# Patient Record
Sex: FEMALE | Race: WHITE | NOT HISPANIC OR LATINO | Employment: UNEMPLOYED | URBAN - METROPOLITAN AREA
[De-identification: names, ages, dates, MRNs, and addresses within clinical notes are randomized per-mention and may not be internally consistent; named-entity substitution may affect disease eponyms.]

---

## 2017-11-09 PROBLEM — M25.512 CHRONIC LEFT SHOULDER PAIN: Status: ACTIVE | Noted: 2017-11-09

## 2017-11-09 PROBLEM — G89.29 CHRONIC LEFT SHOULDER PAIN: Status: ACTIVE | Noted: 2017-11-09

## 2017-11-13 PROBLEM — M54.2 CERVICALGIA: Status: ACTIVE | Noted: 2017-11-13

## 2018-01-19 PROBLEM — M25.512 CHRONIC LEFT SHOULDER PAIN: Status: RESOLVED | Noted: 2017-11-09 | Resolved: 2018-01-19

## 2018-01-19 PROBLEM — G89.29 CHRONIC LEFT SHOULDER PAIN: Status: RESOLVED | Noted: 2017-11-09 | Resolved: 2018-01-19

## 2018-01-19 PROBLEM — M54.2 CERVICALGIA: Status: RESOLVED | Noted: 2017-11-13 | Resolved: 2018-01-19

## 2019-10-08 PROBLEM — S46.011A TRAUMATIC COMPLETE TEAR OF RIGHT ROTATOR CUFF: Status: ACTIVE | Noted: 2019-10-08

## 2021-10-11 DIAGNOSIS — Z13.21 SCREENING FOR MALNUTRITION: ICD-10-CM

## 2021-10-11 DIAGNOSIS — E03.9 PRIMARY HYPOTHYROIDISM: ICD-10-CM

## 2021-10-11 DIAGNOSIS — Z13.220 SCREENING CHOLESTEROL LEVEL: ICD-10-CM

## 2021-10-11 DIAGNOSIS — Z13.1 SCREENING FOR DIABETES MELLITUS: Primary | ICD-10-CM

## 2021-10-15 DIAGNOSIS — Z13.1 DIABETES MELLITUS SCREENING: Primary | ICD-10-CM

## 2021-10-15 DIAGNOSIS — Z13.6 SCREENING FOR HYPERTENSION: ICD-10-CM

## 2021-10-15 DIAGNOSIS — Z13.21 MALNUTRITION SCREEN: ICD-10-CM

## 2021-10-15 DIAGNOSIS — Z13.220 SCREENING CHOLESTEROL LEVEL: ICD-10-CM

## 2021-10-15 DIAGNOSIS — E03.9 PRIMARY HYPOTHYROIDISM: ICD-10-CM

## 2022-03-10 PROBLEM — R00.2 PALPITATIONS: Status: ACTIVE | Noted: 2022-03-10

## 2022-03-10 PROBLEM — E04.1 THYROID NODULE: Status: ACTIVE | Noted: 2022-03-10

## 2022-03-10 PROBLEM — F33.41 RECURRENT MAJOR DEPRESSIVE DISORDER, IN PARTIAL REMISSION: Status: ACTIVE | Noted: 2022-03-10

## 2022-04-13 PROBLEM — J01.01 ACUTE RECURRENT MAXILLARY SINUSITIS: Status: ACTIVE | Noted: 2022-04-13

## 2022-05-12 PROBLEM — I10 ESSENTIAL HYPERTENSION: Status: ACTIVE | Noted: 2022-05-12

## 2022-05-12 PROBLEM — E78.2 MIXED HYPERLIPIDEMIA: Status: ACTIVE | Noted: 2022-05-12

## 2022-07-13 PROBLEM — M51.9 LUMBAR DISC DISEASE: Status: ACTIVE | Noted: 2022-07-13

## 2022-12-02 PROBLEM — D75.839 THROMBOCYTOSIS: Status: ACTIVE | Noted: 2022-12-02

## 2022-12-02 PROBLEM — R41.82 ALTERED MENTAL STATUS: Status: ACTIVE | Noted: 2022-12-02

## 2022-12-02 PROBLEM — I51.81 STRESS-INDUCED CARDIOMYOPATHY: Status: ACTIVE | Noted: 2022-12-02

## 2022-12-02 PROBLEM — I21.4 NSTEMI (NON-ST ELEVATED MYOCARDIAL INFARCTION): Status: ACTIVE | Noted: 2022-12-02
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
## 2022-12-02 PROBLEM — E89.0 HISTORY OF PARTIAL THYROIDECTOMY: Chronic | Status: ACTIVE | Noted: 2022-12-02

## 2022-12-05 PROBLEM — R41.82 ALTERED MENTAL STATUS: Status: RESOLVED | Noted: 2022-12-02 | Resolved: 2022-12-05

## 2022-12-05 PROBLEM — D75.839 THROMBOCYTOSIS: Status: RESOLVED | Noted: 2022-12-02 | Resolved: 2022-12-05

## 2022-12-05 PROBLEM — I10 ESSENTIAL HYPERTENSION: Status: RESOLVED | Noted: 2022-05-12 | Resolved: 2022-12-05

## 2022-12-05 PROBLEM — I21.4 NSTEMI (NON-ST ELEVATED MYOCARDIAL INFARCTION): Status: RESOLVED | Noted: 2022-12-02 | Resolved: 2022-12-05

## 2022-12-09 ENCOUNTER — TELEPHONE (OUTPATIENT)
Dept: CARDIOLOGY | Facility: CLINIC | Age: 47
End: 2022-12-09
Payer: MEDICARE

## 2022-12-09 NOTE — TELEPHONE ENCOUNTER
Gave patient appt for 12/12/2022 at 2:00 pm.      ----- Message from Karon Kamara sent at 12/9/2022 12:53 PM CST -----  Regarding: appt  Contact: Pt [phone]  Patient called to schedule hospital follow up visit please call to discuss further

## 2022-12-12 ENCOUNTER — OFFICE VISIT (OUTPATIENT)
Dept: CARDIOLOGY | Facility: CLINIC | Age: 47
End: 2022-12-12
Payer: MEDICARE

## 2022-12-12 VITALS
BODY MASS INDEX: 24 KG/M2 | HEIGHT: 61 IN | WEIGHT: 127.13 LBS | HEART RATE: 87 BPM | SYSTOLIC BLOOD PRESSURE: 113 MMHG | DIASTOLIC BLOOD PRESSURE: 72 MMHG | OXYGEN SATURATION: 95 %

## 2022-12-12 DIAGNOSIS — I51.81 STRESS-INDUCED CARDIOMYOPATHY: ICD-10-CM

## 2022-12-12 PROCEDURE — 3074F PR MOST RECENT SYSTOLIC BLOOD PRESSURE < 130 MM HG: ICD-10-PCS | Mod: CPTII,S$GLB,, | Performed by: NURSE PRACTITIONER

## 2022-12-12 PROCEDURE — 3078F PR MOST RECENT DIASTOLIC BLOOD PRESSURE < 80 MM HG: ICD-10-PCS | Mod: CPTII,S$GLB,, | Performed by: NURSE PRACTITIONER

## 2022-12-12 PROCEDURE — 1111F DSCHRG MED/CURRENT MED MERGE: CPT | Mod: CPTII,S$GLB,, | Performed by: NURSE PRACTITIONER

## 2022-12-12 PROCEDURE — 3008F PR BODY MASS INDEX (BMI) DOCUMENTED: ICD-10-PCS | Mod: CPTII,S$GLB,, | Performed by: NURSE PRACTITIONER

## 2022-12-12 PROCEDURE — 1159F PR MEDICATION LIST DOCUMENTED IN MEDICAL RECORD: ICD-10-PCS | Mod: CPTII,S$GLB,, | Performed by: NURSE PRACTITIONER

## 2022-12-12 PROCEDURE — 1159F MED LIST DOCD IN RCRD: CPT | Mod: CPTII,S$GLB,, | Performed by: NURSE PRACTITIONER

## 2022-12-12 PROCEDURE — 99212 OFFICE O/P EST SF 10 MIN: CPT | Mod: S$GLB,,, | Performed by: NURSE PRACTITIONER

## 2022-12-12 PROCEDURE — 3074F SYST BP LT 130 MM HG: CPT | Mod: CPTII,S$GLB,, | Performed by: NURSE PRACTITIONER

## 2022-12-12 PROCEDURE — 1111F PR DISCHARGE MEDS RECONCILED W/ CURRENT OUTPATIENT MED LIST: ICD-10-PCS | Mod: CPTII,S$GLB,, | Performed by: NURSE PRACTITIONER

## 2022-12-12 PROCEDURE — 1160F PR REVIEW ALL MEDS BY PRESCRIBER/CLIN PHARMACIST DOCUMENTED: ICD-10-PCS | Mod: CPTII,S$GLB,, | Performed by: NURSE PRACTITIONER

## 2022-12-12 PROCEDURE — 3044F HG A1C LEVEL LT 7.0%: CPT | Mod: CPTII,S$GLB,, | Performed by: NURSE PRACTITIONER

## 2022-12-12 PROCEDURE — 99999 PR PBB SHADOW E&M-EST. PATIENT-LVL IV: CPT | Mod: PBBFAC,,, | Performed by: NURSE PRACTITIONER

## 2022-12-12 PROCEDURE — 3044F PR MOST RECENT HEMOGLOBIN A1C LEVEL <7.0%: ICD-10-PCS | Mod: CPTII,S$GLB,, | Performed by: NURSE PRACTITIONER

## 2022-12-12 PROCEDURE — 1160F RVW MEDS BY RX/DR IN RCRD: CPT | Mod: CPTII,S$GLB,, | Performed by: NURSE PRACTITIONER

## 2022-12-12 PROCEDURE — 99999 PR PBB SHADOW E&M-EST. PATIENT-LVL IV: ICD-10-PCS | Mod: PBBFAC,,, | Performed by: NURSE PRACTITIONER

## 2022-12-12 PROCEDURE — 3078F DIAST BP <80 MM HG: CPT | Mod: CPTII,S$GLB,, | Performed by: NURSE PRACTITIONER

## 2022-12-12 PROCEDURE — 99212 PR OFFICE/OUTPT VISIT, EST, LEVL II, 10-19 MIN: ICD-10-PCS | Mod: S$GLB,,, | Performed by: NURSE PRACTITIONER

## 2022-12-12 PROCEDURE — 3008F BODY MASS INDEX DOCD: CPT | Mod: CPTII,S$GLB,, | Performed by: NURSE PRACTITIONER

## 2022-12-12 RX ORDER — ATORVASTATIN CALCIUM 80 MG/1
80 TABLET, FILM COATED ORAL NIGHTLY
COMMUNITY
Start: 2022-11-10 | End: 2023-06-08

## 2022-12-12 RX ORDER — FLUTICASONE PROPIONATE 50 MCG
SPRAY, SUSPENSION (ML) NASAL
COMMUNITY
Start: 2022-08-10

## 2022-12-12 NOTE — PROGRESS NOTES
Cardiology    12/12/2022  1:29 PM    Problem list  Patient Active Problem List   Diagnosis    Traumatic complete tear of right rotator cuff    Palpitations    Thyroid nodule    Recurrent major depressive disorder, in partial remission    Acute recurrent maxillary sinusitis    Mixed hyperlipidemia    Lumbar disc disease    History of partial thyroidectomy    Fall    Stress-induced cardiomyopathy       CC:  Hospital follow up    HPI:  Having a pressure type pain on lateral left chest and anterior left chest- thinks it may be due to the falls she had prior to this hospitalization.  Having lots of belching, requesting Pepcid because of reflux.  She has been very careful at home, tends to sleep much of the day and gets up rarely. Did have a partial thyroidectomy in September and does feel like it has improved some. Having daytime somnolence.   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 This may have contributed.  No other falls since she got home. Having trouble doing without caffeine.  She drinks coffee all day.  Does not use milk or sugar. No swelling in legs, unable to lie down flat due to her thyroid issues.  The number of pillows that she uses depends on where she is, around 2 pillows. Smokes cigarettes, used to smoke more but has tried to cut back.  Feels like she can't get enough oxygen sometimes depending on her position but it is difficult to say. Eats a lot of instant foods.    Medications  Current Outpatient Medications   Medication Sig Dispense Refill    aspirin (ECOTRIN) 81 MG EC tablet Take 1 tablet (81 mg total) by mouth once daily. 30 tablet 3    atorvastatin (LIPITOR) 80 MG tablet Take 80 mg by mouth.      ergocalciferol (VITAMIN D2) 50,000 unit Cap TAKE 1 CAPSULE BY MOUTH TWICE WEEKLY AS DIRECTED 8 capsule 3    FLUoxetine 20 MG capsule TAKE 1 CAPSULE BY MOUTH THREE  TIMES DAILY 90 capsule 3    fluticasone propionate (FLONASE) 50 mcg/actuation nasal spray Pt takes PRN when she has Rx      gabapentin (NEURONTIN) 300 MG capsule Take 1 capsule (300 mg total) by mouth 3 (three) times daily. 90 capsule 3    HYDROcodone-acetaminophen (NORCO)  mg per tablet Take 1 tablet by mouth 2 (two) times a day.      traZODone (DESYREL) 50 MG tablet TAKE 1 TO 2 TABLETS BY MOUTH EACH NIGHT AT BEDTIME (Patient taking differently: TAKE 1 TO 2 TABLETS BY MOUTH EACH NIGHT AT BEDTIME  Pt takes PRN) 60 tablet 3    atorvastatin (LIPITOR) 40 MG tablet TAKE ONE TABLET BY MOUTH EACH EVENING FOR CHOLESTEROL 30 tablet 3     No current facility-administered medications for this visit.      Prior to Admission medications    Medication Sig Start Date End Date Taking? Authorizing Provider   aspirin (ECOTRIN) 81 MG EC tablet Take 1 tablet (81 mg total) by mouth once daily. 12/6/22 12/6/23 Yes Kenzie Goff NP   atorvastatin (LIPITOR) 80 MG tablet Take 80 mg by mouth. 11/10/22  Yes Historical Provider   ergocalciferol (VITAMIN D2) 50,000 unit Cap TAKE 1 CAPSULE BY MOUTH TWICE WEEKLY AS DIRECTED 9/12/22  Yes Beni Blount MD   FLUoxetine 20 MG capsule TAKE 1 CAPSULE BY MOUTH THREE TIMES DAILY 9/12/22  Yes Beni Blount MD   fluticasone propionate (FLONASE) 50 mcg/actuation nasal spray Pt takes PRN when she has Rx 8/10/22  Yes Historical Provider   gabapentin (NEURONTIN) 300 MG capsule Take 1 capsule (300 mg total) by mouth 3 (three) times daily. 5/12/22  Yes Beni Blount MD   HYDROcodone-acetaminophen (NORCO)  mg per tablet Take 1 tablet by mouth 2 (two) times a day. 4/13/22  Yes Historical Provider   traZODone (DESYREL) 50 MG tablet TAKE 1 TO 2 TABLETS BY MOUTH EACH NIGHT AT BEDTIME  Patient taking differently: TAKE 1 TO 2 TABLETS BY MOUTH EACH NIGHT AT BEDTIME  Pt takes PRN 9/12/22  Yes Beni Blount MD   atorvastatin (LIPITOR) 40 MG tablet TAKE ONE TABLET BY MOUTH EACH EVENING FOR  CHOLESTEROL 9/12/22   Beni Blount MD         History  Past Medical History:   Diagnosis Date    Thyroid nodule 3/10/2022     Past Surgical History:   Procedure Laterality Date    BREAST LUMPECTOMY      HYSTERECTOMY       Social History     Socioeconomic History    Marital status: Single   Tobacco Use    Smoking status: Light Smoker    Smokeless tobacco: Never   Substance and Sexual Activity    Alcohol use: No    Drug use: No     Social Determinants of Health     Financial Resource Strain: Low Risk     Difficulty of Paying Living Expenses: Not hard at all   Food Insecurity: No Food Insecurity    Worried About Running Out of Food in the Last Year: Never true    Ran Out of Food in the Last Year: Never true   Transportation Needs: No Transportation Needs    Lack of Transportation (Medical): No    Lack of Transportation (Non-Medical): No   Physical Activity: Unknown    Days of Exercise per Week: 7 days   Stress: No Stress Concern Present    Feeling of Stress : Only a little   Social Connections: Moderately Integrated    Frequency of Communication with Friends and Family: More than three times a week    Frequency of Social Gatherings with Friends and Family: More than three times a week    Attends [employer] Services: 1 to 4 times per year    Active Member of Clubs or Organizations: Yes    Attends Club or Organization Meetings: 1 to 4 times per year    Marital Status: Never    Housing Stability: Low Risk     Unable to Pay for Housing in the Last Year: No    Number of Places Lived in the Last Year: 1    Unstable Housing in the Last Year: No         Allergies  Review of patient's allergies indicates:   Allergen Reactions    Benadryl [diphenhydramine hcl] Other (See Comments)    Morphine Swelling    Penicillins Other (See Comments)         Review of Systems   Review of Systems   Constitutional: Negative for diaphoresis and malaise/fatigue.   HENT: Negative.     Cardiovascular:  Positive for chest pain (left  lateral). Negative for claudication, dyspnea on exertion, irregular heartbeat, leg swelling, near-syncope, orthopnea, palpitations, paroxysmal nocturnal dyspnea and syncope.   Respiratory:  Negative for shortness of breath.    Endocrine: Negative for polydipsia, polyphagia and polyuria.   Hematologic/Lymphatic: Does not bruise/bleed easily.   Gastrointestinal:  Negative for bloating, nausea and vomiting.   Genitourinary: Negative.    Neurological:  Negative for excessive daytime sleepiness, dizziness, light-headedness, loss of balance and weakness.   Psychiatric/Behavioral:  The patient is not nervous/anxious.    Allergic/Immunologic: Negative.        Physical Exam  Wt Readings from Last 1 Encounters:   12/12/22 57.6 kg (127 lb 1.5 oz)     BP Readings from Last 3 Encounters:   12/12/22 113/72   12/08/22 100/81   12/05/22 (!) 95/53     Pulse Readings from Last 1 Encounters:   12/12/22 87     Body mass index is 24.01 kg/m².    Physical Exam  Vitals and nursing note reviewed.   Constitutional:       Appearance: Normal appearance.   HENT:      Head: Normocephalic and atraumatic.      Mouth/Throat:      Mouth: Mucous membranes are moist.   Eyes:      Pupils: Pupils are equal, round, and reactive to light.   Cardiovascular:      Rate and Rhythm: Normal rate and regular rhythm.      Pulses:           Radial pulses are 2+ on the right side and 2+ on the left side.        Dorsalis pedis pulses are 2+ on the right side and 2+ on the left side.        Posterior tibial pulses are 2+ on the right side and 2+ on the left side.      Heart sounds: No murmur heard.  Pulmonary:      Effort: Pulmonary effort is normal. No respiratory distress.      Breath sounds: Normal breath sounds.   Abdominal:      General: There is no distension.      Tenderness: There is no abdominal tenderness.   Musculoskeletal:      Cervical back: Normal range of motion.      Right lower leg: No edema.      Left lower leg: No edema.   Skin:     General: Skin  is warm and dry.      Findings: No erythema.   Neurological:      General: No focal deficit present.      Mental Status: She is alert.   Psychiatric:         Mood and Affect: Mood normal.         Behavior: Behavior normal.      Comments: tangential     Problem List Items Addressed This Visit          Cardiac/Vascular    Stress-induced cardiomyopathy    Overview     Trial lopressor 12.5 mg BID  Will need repeat echo, will attempt to titrate GDMT, has had great difficulty tolerating meds thus far                      Follow Up        @Hillary Mejias DNP

## 2022-12-12 NOTE — PATIENT INSTRUCTIONS
I will discuss your case your case with Dr. Jones- we will call you to discuss    We will plan to repeat your echocardiogram in March or so    We will plan to see you back in 6 weeks or so

## 2022-12-13 ENCOUNTER — TELEPHONE (OUTPATIENT)
Dept: CARDIOLOGY | Facility: CLINIC | Age: 47
End: 2022-12-13
Payer: MEDICARE

## 2022-12-13 RX ORDER — METOPROLOL TARTRATE 25 MG/1
12.5 TABLET, FILM COATED ORAL 2 TIMES DAILY
Qty: 30 TABLET | Refills: 11 | Status: SHIPPED | OUTPATIENT
Start: 2022-12-13 | End: 2023-02-23

## 2022-12-13 RX ORDER — METOPROLOL SUCCINATE 25 MG/1
12.5 TABLET, EXTENDED RELEASE ORAL DAILY
Qty: 15 TABLET | Refills: 11 | Status: SHIPPED | OUTPATIENT
Start: 2022-12-13 | End: 2022-12-13

## 2022-12-13 NOTE — TELEPHONE ENCOUNTER
----- Message from Hillary Mejias DNP sent at 12/13/2022  2:24 PM CST -----  Erickson Man,    I am starting this patient on a low dose short acting metoprolol. Discussed her case with Dr. Jones and this med will help her heart function and the shorter acting dosing may be less likely to cause lower blood pressure.    We will see her back in about 2 weeks to check BP.    hillary

## 2022-12-13 NOTE — TELEPHONE ENCOUNTER
Informed patient that NELDA Mejias ordered a low dose short acting metoprolol., patient will  from pharmacy.  Scheduled follow up on 1/6/22 @ 10:00 AM.

## 2023-01-06 ENCOUNTER — OFFICE VISIT (OUTPATIENT)
Dept: CARDIOLOGY | Facility: CLINIC | Age: 48
End: 2023-01-06
Payer: MEDICARE

## 2023-01-06 VITALS
HEART RATE: 90 BPM | DIASTOLIC BLOOD PRESSURE: 67 MMHG | SYSTOLIC BLOOD PRESSURE: 100 MMHG | OXYGEN SATURATION: 98 % | HEIGHT: 61 IN | WEIGHT: 131.5 LBS | BODY MASS INDEX: 24.83 KG/M2

## 2023-01-06 DIAGNOSIS — I51.81 STRESS-INDUCED CARDIOMYOPATHY: ICD-10-CM

## 2023-01-06 DIAGNOSIS — R00.2 PALPITATIONS: Primary | ICD-10-CM

## 2023-01-06 PROCEDURE — 99999 PR PBB SHADOW E&M-EST. PATIENT-LVL III: ICD-10-PCS | Mod: PBBFAC,,, | Performed by: NURSE PRACTITIONER

## 2023-01-06 PROCEDURE — 99212 OFFICE O/P EST SF 10 MIN: CPT | Mod: S$GLB,,, | Performed by: NURSE PRACTITIONER

## 2023-01-06 PROCEDURE — 3074F PR MOST RECENT SYSTOLIC BLOOD PRESSURE < 130 MM HG: ICD-10-PCS | Mod: CPTII,S$GLB,, | Performed by: NURSE PRACTITIONER

## 2023-01-06 PROCEDURE — 1159F MED LIST DOCD IN RCRD: CPT | Mod: CPTII,S$GLB,, | Performed by: NURSE PRACTITIONER

## 2023-01-06 PROCEDURE — 3078F DIAST BP <80 MM HG: CPT | Mod: CPTII,S$GLB,, | Performed by: NURSE PRACTITIONER

## 2023-01-06 PROCEDURE — 99999 PR PBB SHADOW E&M-EST. PATIENT-LVL III: CPT | Mod: PBBFAC,,, | Performed by: NURSE PRACTITIONER

## 2023-01-06 PROCEDURE — 3008F PR BODY MASS INDEX (BMI) DOCUMENTED: ICD-10-PCS | Mod: CPTII,S$GLB,, | Performed by: NURSE PRACTITIONER

## 2023-01-06 PROCEDURE — 99212 PR OFFICE/OUTPT VISIT, EST, LEVL II, 10-19 MIN: ICD-10-PCS | Mod: S$GLB,,, | Performed by: NURSE PRACTITIONER

## 2023-01-06 PROCEDURE — 1160F RVW MEDS BY RX/DR IN RCRD: CPT | Mod: CPTII,S$GLB,, | Performed by: NURSE PRACTITIONER

## 2023-01-06 PROCEDURE — 3078F PR MOST RECENT DIASTOLIC BLOOD PRESSURE < 80 MM HG: ICD-10-PCS | Mod: CPTII,S$GLB,, | Performed by: NURSE PRACTITIONER

## 2023-01-06 PROCEDURE — 1159F PR MEDICATION LIST DOCUMENTED IN MEDICAL RECORD: ICD-10-PCS | Mod: CPTII,S$GLB,, | Performed by: NURSE PRACTITIONER

## 2023-01-06 PROCEDURE — 3074F SYST BP LT 130 MM HG: CPT | Mod: CPTII,S$GLB,, | Performed by: NURSE PRACTITIONER

## 2023-01-06 PROCEDURE — 1160F PR REVIEW ALL MEDS BY PRESCRIBER/CLIN PHARMACIST DOCUMENTED: ICD-10-PCS | Mod: CPTII,S$GLB,, | Performed by: NURSE PRACTITIONER

## 2023-01-06 PROCEDURE — 3008F BODY MASS INDEX DOCD: CPT | Mod: CPTII,S$GLB,, | Performed by: NURSE PRACTITIONER

## 2023-01-06 RX ORDER — MULTIVITAMIN
1 TABLET ORAL
COMMUNITY
Start: 2023-01-03 | End: 2023-02-23

## 2023-01-06 NOTE — PROGRESS NOTES
Cardiology    1/6/2023  10:13 AM    Problem list  Patient Active Problem List   Diagnosis    Traumatic complete tear of right rotator cuff    Palpitations    Thyroid nodule    Recurrent major depressive disorder, in partial remission    Acute recurrent maxillary sinusitis    Mixed hyperlipidemia    Lumbar disc disease    History of partial thyroidectomy    Fall    Stress-induced cardiomyopathy       CC:  BP check    HPI:  SBPs have been around 125   Had a bad headache and almost was unable to get here today.  He does not like to take medication- she is also very concerned about her blood pressure dropping again and falling.  She is monitoring her pressure and being very carefuyl with position changes. Having palpitations at night that do affect her respiratory pattern    Has cut back on cigarettes, does vape but is in the process of trying to quit.  Unable to tolerate patch.    Medications  Current Outpatient Medications   Medication Sig Dispense Refill    aspirin (ECOTRIN) 81 MG EC tablet Take 1 tablet (81 mg total) by mouth once daily. 30 tablet 3    atorvastatin (LIPITOR) 80 MG tablet Take 80 mg by mouth.      calcium-vitamin D 600 mg-10 mcg (400 unit) Tab Take 1 tablet by mouth.      ergocalciferol (VITAMIN D2) 50,000 unit Cap TAKE 1 CAPSULE BY MOUTH TWICE WEEKLY AS DIRECTED 8 capsule 3    FLUoxetine 20 MG capsule TAKE 1 CAPSULE BY MOUTH THREE TIMES DAILY 90 capsule 3    fluticasone propionate (FLONASE) 50 mcg/actuation nasal spray Pt takes PRN when she has Rx      gabapentin (NEURONTIN) 300 MG capsule Take 1 capsule (300 mg total) by mouth 3 (three) times daily. 90 capsule 3    HYDROcodone-acetaminophen (NORCO)  mg per tablet Take 1 tablet by mouth 2 (two) times a day.      metoprolol tartrate (LOPRESSOR) 25 MG tablet Take 0.5 tablets (12.5 mg total) by mouth 2 (two) times daily. 30 tablet 11    traZODone (DESYREL) 50 MG tablet TAKE 1 TO 2 TABLETS BY MOUTH EACH NIGHT AT BEDTIME (Patient taking  differently: TAKE 1 TO 2 TABLETS BY MOUTH EACH NIGHT AT BEDTIME  Pt takes PRN) 60 tablet 3    atorvastatin (LIPITOR) 40 MG tablet TAKE ONE TABLET BY MOUTH EACH EVENING FOR CHOLESTEROL 30 tablet 3     No current facility-administered medications for this visit.      Prior to Admission medications    Medication Sig Start Date End Date Taking? Authorizing Provider   aspirin (ECOTRIN) 81 MG EC tablet Take 1 tablet (81 mg total) by mouth once daily. 12/6/22 12/6/23 Yes Kenzie Goff NP   atorvastatin (LIPITOR) 80 MG tablet Take 80 mg by mouth. 11/10/22  Yes Historical Provider   calcium-vitamin D 600 mg-10 mcg (400 unit) Tab Take 1 tablet by mouth. 1/3/23  Yes Historical Provider   ergocalciferol (VITAMIN D2) 50,000 unit Cap TAKE 1 CAPSULE BY MOUTH TWICE WEEKLY AS DIRECTED 9/12/22  Yes Beni Blount MD   FLUoxetine 20 MG capsule TAKE 1 CAPSULE BY MOUTH THREE TIMES DAILY 9/12/22  Yes Beni Blount MD   fluticasone propionate (FLONASE) 50 mcg/actuation nasal spray Pt takes PRN when she has Rx 8/10/22  Yes Historical Provider   gabapentin (NEURONTIN) 300 MG capsule Take 1 capsule (300 mg total) by mouth 3 (three) times daily. 5/12/22  Yes Beni Blount MD   HYDROcodone-acetaminophen (NORCO)  mg per tablet Take 1 tablet by mouth 2 (two) times a day. 4/13/22  Yes Historical Provider   metoprolol tartrate (LOPRESSOR) 25 MG tablet Take 0.5 tablets (12.5 mg total) by mouth 2 (two) times daily. 12/13/22 12/13/23 Yes Hillary Mejias DNP   traZODone (DESYREL) 50 MG tablet TAKE 1 TO 2 TABLETS BY MOUTH EACH NIGHT AT BEDTIME  Patient taking differently: TAKE 1 TO 2 TABLETS BY MOUTH EACH NIGHT AT BEDTIME  Pt takes PRN 9/12/22  Yes Beni Blount MD   atorvastatin (LIPITOR) 40 MG tablet TAKE ONE TABLET BY MOUTH EACH EVENING FOR CHOLESTEROL 9/12/22   Beni Blount MD         History  Past Medical History:   Diagnosis Date    Thyroid nodule 3/10/2022     Past Surgical History:   Procedure Laterality Date     BREAST LUMPECTOMY      HYSTERECTOMY       Social History     Socioeconomic History    Marital status: Single   Tobacco Use    Smoking status: Light Smoker    Smokeless tobacco: Never   Substance and Sexual Activity    Alcohol use: No    Drug use: No     Social Determinants of Health     Financial Resource Strain: Low Risk     Difficulty of Paying Living Expenses: Not hard at all   Food Insecurity: No Food Insecurity    Worried About Running Out of Food in the Last Year: Never true    Ran Out of Food in the Last Year: Never true   Transportation Needs: No Transportation Needs    Lack of Transportation (Medical): No    Lack of Transportation (Non-Medical): No   Physical Activity: Unknown    Days of Exercise per Week: 7 days   Stress: No Stress Concern Present    Feeling of Stress : Only a little   Social Connections: Moderately Integrated    Frequency of Communication with Friends and Family: More than three times a week    Frequency of Social Gatherings with Friends and Family: More than three times a week    Attends [employer] Services: 1 to 4 times per year    Active Member of Clubs or Organizations: Yes    Attends Club or Organization Meetings: 1 to 4 times per year    Marital Status: Never    Housing Stability: Low Risk     Unable to Pay for Housing in the Last Year: No    Number of Places Lived in the Last Year: 1    Unstable Housing in the Last Year: No         Allergies  Review of patient's allergies indicates:   Allergen Reactions    Benadryl [diphenhydramine hcl] Other (See Comments)    Morphine Swelling    Penicillins Other (See Comments)         Review of Systems   Review of Systems   Constitutional: Negative for diaphoresis and malaise/fatigue.   HENT: Negative.     Cardiovascular:  Negative for chest pain, claudication, dyspnea on exertion, irregular heartbeat, leg swelling, near-syncope, orthopnea, palpitations, paroxysmal nocturnal dyspnea and syncope.   Respiratory:  Negative for shortness of  "breath.    Endocrine: Negative for polydipsia, polyphagia and polyuria.   Hematologic/Lymphatic: Does not bruise/bleed easily.   Gastrointestinal:  Negative for bloating, nausea and vomiting.   Genitourinary: Negative.    Neurological:  Positive for dizziness ("sometmes"). Negative for excessive daytime sleepiness, light-headedness, loss of balance and weakness.   Psychiatric/Behavioral:  The patient is not nervous/anxious.    Allergic/Immunologic: Negative.        Physical Exam  Wt Readings from Last 1 Encounters:   01/06/23 59.6 kg (131 lb 8.1 oz)     BP Readings from Last 3 Encounters:   01/06/23 100/67   12/12/22 113/72   12/08/22 100/81     Pulse Readings from Last 1 Encounters:   01/06/23 90     Body mass index is 24.85 kg/m².    Physical Exam  Vitals and nursing note reviewed.   Constitutional:       Appearance: Normal appearance.   HENT:      Head: Normocephalic and atraumatic.      Mouth/Throat:      Mouth: Mucous membranes are moist.   Eyes:      Pupils: Pupils are equal, round, and reactive to light.   Cardiovascular:      Rate and Rhythm: Normal rate and regular rhythm.      Pulses:           Radial pulses are 2+ on the right side and 2+ on the left side.        Dorsalis pedis pulses are 2+ on the right side and 2+ on the left side.        Posterior tibial pulses are 2+ on the right side and 2+ on the left side.      Heart sounds: No murmur heard.    Gallop present.   Pulmonary:      Effort: Pulmonary effort is normal. No respiratory distress.      Breath sounds: Normal breath sounds.   Abdominal:      General: There is no distension.      Tenderness: There is no abdominal tenderness.   Musculoskeletal:      Cervical back: Normal range of motion.      Right lower leg: No edema.      Left lower leg: No edema.   Skin:     General: Skin is warm and dry.      Findings: No erythema.   Neurological:      General: No focal deficit present.      Mental Status: She is alert.   Psychiatric:         Mood and " Affect: Mood normal.         Behavior: Behavior normal.           Problem List Items Addressed This Visit          Cardiac/Vascular    Palpitations - Primary    Overview     Await Holter  Continue BB         Current Assessment & Plan     As above         Relevant Orders    Holter monitor - 48 hour    Stress-induced cardiomyopathy    Overview     Continue lopressor 12.5 mg BID  Will need repeat echo, will attempt to titrate GDMT, has had great difficulty tolerating meds thus far.  Patient reports that she is not comfortable with increasing BB or adding in other meds at this time due to previous episode of hypoTN         Current Assessment & Plan     As above              Follow Up  4-6 wks      @Hillary Mejias DNP

## 2023-01-06 NOTE — PATIENT INSTRUCTIONS
We will continue the metoprolol 12.5 mg twice a day    Check your blood pressure daily- if you notice that your blood pressure is rising to 120s/130s consistently let us know and we will increase the doses of some of your medications.  If you notice that your blood pressures are decreasing also let us know    We will plan to repeat your echocardiogram in March

## 2023-01-11 ENCOUNTER — DOCUMENT SCAN (OUTPATIENT)
Dept: HOME HEALTH SERVICES | Facility: HOSPITAL | Age: 48
End: 2023-01-11
Payer: MEDICARE

## 2023-02-23 ENCOUNTER — OFFICE VISIT (OUTPATIENT)
Dept: CARDIOLOGY | Facility: CLINIC | Age: 48
End: 2023-02-23
Payer: MEDICARE

## 2023-02-23 VITALS
SYSTOLIC BLOOD PRESSURE: 108 MMHG | HEART RATE: 92 BPM | HEIGHT: 61 IN | WEIGHT: 131.5 LBS | DIASTOLIC BLOOD PRESSURE: 68 MMHG | OXYGEN SATURATION: 97 % | BODY MASS INDEX: 24.83 KG/M2

## 2023-02-23 DIAGNOSIS — I50.21 ACUTE SYSTOLIC CONGESTIVE HEART FAILURE: ICD-10-CM

## 2023-02-23 DIAGNOSIS — I51.81 STRESS-INDUCED CARDIOMYOPATHY: Primary | ICD-10-CM

## 2023-02-23 DIAGNOSIS — R00.2 PALPITATIONS: ICD-10-CM

## 2023-02-23 PROCEDURE — 3078F PR MOST RECENT DIASTOLIC BLOOD PRESSURE < 80 MM HG: ICD-10-PCS | Mod: CPTII,S$GLB,, | Performed by: NURSE PRACTITIONER

## 2023-02-23 PROCEDURE — 1160F RVW MEDS BY RX/DR IN RCRD: CPT | Mod: CPTII,S$GLB,, | Performed by: NURSE PRACTITIONER

## 2023-02-23 PROCEDURE — 99999 PR PBB SHADOW E&M-EST. PATIENT-LVL IV: CPT | Mod: PBBFAC,,, | Performed by: NURSE PRACTITIONER

## 2023-02-23 PROCEDURE — 3008F BODY MASS INDEX DOCD: CPT | Mod: CPTII,S$GLB,, | Performed by: NURSE PRACTITIONER

## 2023-02-23 PROCEDURE — 3078F DIAST BP <80 MM HG: CPT | Mod: CPTII,S$GLB,, | Performed by: NURSE PRACTITIONER

## 2023-02-23 PROCEDURE — 3074F SYST BP LT 130 MM HG: CPT | Mod: CPTII,S$GLB,, | Performed by: NURSE PRACTITIONER

## 2023-02-23 PROCEDURE — 3074F PR MOST RECENT SYSTOLIC BLOOD PRESSURE < 130 MM HG: ICD-10-PCS | Mod: CPTII,S$GLB,, | Performed by: NURSE PRACTITIONER

## 2023-02-23 PROCEDURE — 99999 PR PBB SHADOW E&M-EST. PATIENT-LVL IV: ICD-10-PCS | Mod: PBBFAC,,, | Performed by: NURSE PRACTITIONER

## 2023-02-23 PROCEDURE — 99212 OFFICE O/P EST SF 10 MIN: CPT | Mod: S$GLB,,, | Performed by: NURSE PRACTITIONER

## 2023-02-23 PROCEDURE — 1159F PR MEDICATION LIST DOCUMENTED IN MEDICAL RECORD: ICD-10-PCS | Mod: CPTII,S$GLB,, | Performed by: NURSE PRACTITIONER

## 2023-02-23 PROCEDURE — 99212 PR OFFICE/OUTPT VISIT, EST, LEVL II, 10-19 MIN: ICD-10-PCS | Mod: S$GLB,,, | Performed by: NURSE PRACTITIONER

## 2023-02-23 PROCEDURE — 1159F MED LIST DOCD IN RCRD: CPT | Mod: CPTII,S$GLB,, | Performed by: NURSE PRACTITIONER

## 2023-02-23 PROCEDURE — 3008F PR BODY MASS INDEX (BMI) DOCUMENTED: ICD-10-PCS | Mod: CPTII,S$GLB,, | Performed by: NURSE PRACTITIONER

## 2023-02-23 PROCEDURE — 93000 ELECTROCARDIOGRAM COMPLETE: CPT | Mod: S$GLB,,, | Performed by: INTERNAL MEDICINE

## 2023-02-23 PROCEDURE — 1160F PR REVIEW ALL MEDS BY PRESCRIBER/CLIN PHARMACIST DOCUMENTED: ICD-10-PCS | Mod: CPTII,S$GLB,, | Performed by: NURSE PRACTITIONER

## 2023-02-23 PROCEDURE — 93000 EKG 12-LEAD: ICD-10-PCS | Mod: S$GLB,,, | Performed by: INTERNAL MEDICINE

## 2023-02-23 RX ORDER — METOPROLOL SUCCINATE 25 MG/1
TABLET, EXTENDED RELEASE ORAL
COMMUNITY
Start: 2022-12-13 | End: 2023-05-17

## 2023-02-23 NOTE — PROGRESS NOTES
"        Cardiology    2/23/2023  1:55 PM    Problem list  Patient Active Problem List   Diagnosis    Traumatic complete tear of right rotator cuff    Palpitations    Thyroid nodule    Recurrent major depressive disorder, in partial remission    Acute recurrent maxillary sinusitis    Mixed hyperlipidemia    Lumbar disc disease    History of partial thyroidectomy    Fall    Stress-induced cardiomyopathy       CC:  HFrEF    HPI:  Unclear how she is feeling- she is not sure "If she is having good days or not" because she is not sure how she should feel.  Sometimes her heart will pound out of her chest and sometimes when she has to breathe she stacks her breaths and it resolves.  Has the heart sensation at bedtime.  This has been occurring on an off for sometime.  She is still smoking.     Medications  Current Outpatient Medications   Medication Sig Dispense Refill    aspirin (ECOTRIN) 81 MG EC tablet Take 1 tablet (81 mg total) by mouth once daily. 30 tablet 3    atorvastatin (LIPITOR) 80 MG tablet Take 80 mg by mouth every evening.      ergocalciferol (VITAMIN D2) 50,000 unit Cap TAKE 1 CAPSULE BY MOUTH TWICE WEEKLY AS DIRECTED 8 capsule 3    FLUoxetine 20 MG capsule TAKE 1 CAPSULE BY MOUTH THREE TIMES DAILY 90 capsule 3    fluticasone propionate (FLONASE) 50 mcg/actuation nasal spray Pt takes PRN when she has Rx      gabapentin (NEURONTIN) 300 MG capsule Take 1 capsule (300 mg total) by mouth 3 (three) times daily. 90 capsule 3    HYDROcodone-acetaminophen (NORCO)  mg per tablet Take 1 tablet by mouth 2 (two) times a day.      metoprolol succinate (TOPROL-XL) 25 MG 24 hr tablet Take by mouth.      traZODone (DESYREL) 50 MG tablet TAKE 1 TO 2 TABLETS BY MOUTH EACH NIGHT AT BEDTIME (Patient taking differently: TAKE 1 TO 2 TABLETS BY MOUTH EACH NIGHT AT BEDTIME  Pt takes PRN) 60 tablet 3    atorvastatin (LIPITOR) 40 MG tablet TAKE ONE TABLET BY MOUTH EACH EVENING FOR CHOLESTEROL 30 tablet 3    calcium-vitamin D 600 " mg-10 mcg (400 unit) Tab Take 1 tablet by mouth.      metoprolol tartrate (LOPRESSOR) 25 MG tablet Take 0.5 tablets (12.5 mg total) by mouth 2 (two) times daily. 30 tablet 11     No current facility-administered medications for this visit.      Prior to Admission medications    Medication Sig Start Date End Date Taking? Authorizing Provider   aspirin (ECOTRIN) 81 MG EC tablet Take 1 tablet (81 mg total) by mouth once daily. 12/6/22 12/6/23 Yes Kenzie Goff NP   atorvastatin (LIPITOR) 80 MG tablet Take 80 mg by mouth every evening. 11/10/22  Yes Historical Provider   ergocalciferol (VITAMIN D2) 50,000 unit Cap TAKE 1 CAPSULE BY MOUTH TWICE WEEKLY AS DIRECTED 9/12/22  Yes Beni Blount MD   FLUoxetine 20 MG capsule TAKE 1 CAPSULE BY MOUTH THREE TIMES DAILY 9/12/22  Yes Beni Blount MD   fluticasone propionate (FLONASE) 50 mcg/actuation nasal spray Pt takes PRN when she has Rx 8/10/22  Yes Historical Provider   gabapentin (NEURONTIN) 300 MG capsule Take 1 capsule (300 mg total) by mouth 3 (three) times daily. 5/12/22  Yes Beni Blount MD   HYDROcodone-acetaminophen (NORCO)  mg per tablet Take 1 tablet by mouth 2 (two) times a day. 4/13/22  Yes Historical Provider   metoprolol succinate (TOPROL-XL) 25 MG 24 hr tablet Take by mouth. 12/13/22  Yes Historical Provider   traZODone (DESYREL) 50 MG tablet TAKE 1 TO 2 TABLETS BY MOUTH EACH NIGHT AT BEDTIME  Patient taking differently: TAKE 1 TO 2 TABLETS BY MOUTH EACH NIGHT AT BEDTIME  Pt takes PRN 9/12/22  Yes Beni Blount MD   atorvastatin (LIPITOR) 40 MG tablet TAKE ONE TABLET BY MOUTH EACH EVENING FOR CHOLESTEROL 9/12/22   Beni Blount MD   calcium-vitamin D 600 mg-10 mcg (400 unit) Tab Take 1 tablet by mouth. 1/3/23   Historical Provider   metoprolol tartrate (LOPRESSOR) 25 MG tablet Take 0.5 tablets (12.5 mg total) by mouth 2 (two) times daily. 12/13/22 12/13/23  Hillary Mejias DNP         History  Past Medical History:   Diagnosis  Date    Thyroid nodule 3/10/2022     Past Surgical History:   Procedure Laterality Date    BREAST LUMPECTOMY      HYSTERECTOMY       Social History     Socioeconomic History    Marital status: Single   Tobacco Use    Smoking status: Light Smoker    Smokeless tobacco: Never   Substance and Sexual Activity    Alcohol use: No    Drug use: No     Social Determinants of Health     Financial Resource Strain: Low Risk     Difficulty of Paying Living Expenses: Not hard at all   Food Insecurity: No Food Insecurity    Worried About Running Out of Food in the Last Year: Never true    Ran Out of Food in the Last Year: Never true   Transportation Needs: No Transportation Needs    Lack of Transportation (Medical): No    Lack of Transportation (Non-Medical): No   Physical Activity: Unknown    Days of Exercise per Week: 7 days   Stress: No Stress Concern Present    Feeling of Stress : Only a little   Social Connections: Moderately Integrated    Frequency of Communication with Friends and Family: More than three times a week    Frequency of Social Gatherings with Friends and Family: More than three times a week    Attends Tenriism Services: 1 to 4 times per year    Active Member of Clubs or Organizations: Yes    Attends Club or Organization Meetings: 1 to 4 times per year    Marital Status: Never    Housing Stability: Low Risk     Unable to Pay for Housing in the Last Year: No    Number of Places Lived in the Last Year: 1    Unstable Housing in the Last Year: No         Allergies  Review of patient's allergies indicates:   Allergen Reactions    Benadryl [diphenhydramine hcl] Other (See Comments)    Morphine Swelling    Penicillins Other (See Comments)         Review of Systems   Review of Systems   Constitutional: Negative for diaphoresis and malaise/fatigue.   HENT: Negative.     Cardiovascular:  Positive for palpitations. Negative for chest pain, claudication, dyspnea on exertion, irregular heartbeat, leg swelling,  near-syncope, orthopnea, paroxysmal nocturnal dyspnea and syncope.   Respiratory:  Negative for shortness of breath.    Endocrine: Negative for polydipsia, polyphagia and polyuria.   Hematologic/Lymphatic: Does not bruise/bleed easily.   Gastrointestinal:  Negative for bloating, nausea and vomiting.   Genitourinary: Negative.    Neurological:  Negative for excessive daytime sleepiness, dizziness, light-headedness, loss of balance and weakness.   Psychiatric/Behavioral:  The patient is not nervous/anxious.    Allergic/Immunologic: Negative.        Physical Exam  Wt Readings from Last 1 Encounters:   02/23/23 59.6 kg (131 lb 8.1 oz)     BP Readings from Last 3 Encounters:   02/23/23 108/68   02/13/23 135/85   01/11/23 107/89     Pulse Readings from Last 1 Encounters:   02/23/23 92     Body mass index is 24.85 kg/m².    Physical Exam  Vitals and nursing note reviewed.   Constitutional:       Appearance: Normal appearance.   HENT:      Head: Normocephalic and atraumatic.      Mouth/Throat:      Mouth: Mucous membranes are moist.   Eyes:      Pupils: Pupils are equal, round, and reactive to light.   Cardiovascular:      Rate and Rhythm: Normal rate and regular rhythm.      Pulses:           Radial pulses are 2+ on the right side and 2+ on the left side.        Dorsalis pedis pulses are 2+ on the right side and 2+ on the left side.        Posterior tibial pulses are 2+ on the right side and 2+ on the left side.      Heart sounds: No murmur heard.    Gallop present.   Pulmonary:      Effort: Pulmonary effort is normal. No respiratory distress.      Breath sounds: Normal breath sounds.   Abdominal:      General: There is no distension.      Tenderness: There is no abdominal tenderness.   Musculoskeletal:      Cervical back: Normal range of motion.      Right lower leg: No edema.      Left lower leg: No edema.   Skin:     General: Skin is warm and dry.      Findings: No erythema.   Neurological:      General: No focal  deficit present.      Mental Status: She is alert.   Psychiatric:         Mood and Affect: Mood normal.         Behavior: Behavior normal.         Problem List Items Addressed This Visit          Cardiac/Vascular    Palpitations    Overview     Await Holter  Continue BB         Current Assessment & Plan     As above         Stress-induced cardiomyopathy - Primary    Overview     Continue Toprol 25 mg QD  Will need repeat echo in coming weeks, will attempt to titrate GDMT, has had great difficulty tolerating meds thus far.  Patient reports that she is not comfortable with increasing BB or adding in other meds at this time due to previous episode of hypoTN         Current Assessment & Plan     As above         Relevant Orders    IN OFFICE EKG 12-LEAD (to [place])    Echo    Acute systolic congestive heart failure               Follow Up        @Hillary Mejias DNP

## 2023-02-23 NOTE — PATIENT INSTRUCTIONS
We will repeat your echocardiogram in early March to look at your ejection fraction    Based on this we will discuss next steps.  If the ejection fraction is better we will continue the current plan.    If the ejection fraction is worse we may discuss changing your medications

## 2023-03-07 ENCOUNTER — TELEPHONE (OUTPATIENT)
Dept: CARDIOLOGY | Facility: CLINIC | Age: 48
End: 2023-03-07
Payer: MEDICARE

## 2023-03-07 NOTE — TELEPHONE ENCOUNTER
----- Message from Hillary Mejias DNP sent at 3/7/2023 12:47 PM CST -----  Please contact the patient and let them know that their results were fine and we will continue our current treatment plan

## 2023-03-07 NOTE — TELEPHONE ENCOUNTER
Informed patient that ECHO results were fine, patient will continue current treatment plan.  Has a follow up appointment with NELDA Mejias on 4/6/23 @ 1:30 PM.

## 2023-03-30 ENCOUNTER — TELEPHONE (OUTPATIENT)
Dept: SURGERY | Facility: CLINIC | Age: 48
End: 2023-03-30
Payer: MEDICARE

## 2023-03-30 RX ORDER — SODIUM PICOSULFATE, MAGNESIUM OXIDE, AND ANHYDROUS CITRIC ACID 10; 3.5; 12 MG/160ML; G/160ML; G/160ML
160 LIQUID ORAL
Qty: 320 ML | Refills: 0 | Status: SHIPPED | OUTPATIENT
Start: 2023-03-30

## 2023-03-30 NOTE — TELEPHONE ENCOUNTER
Called patient in reference to a referral to Colorectal Surgery for colon cancer screening. Patient verbally consented to a Colonoscopy and requested to be scheduled for a Colonoscopy on 05/01/2023 Patient was advised a designated  is required on the day of the Colonoscopy to drive the patient home and the  must be at least. 18 years old.Colonoscopy Prep instructions were thoroughly explained and discussed with the patient.It was emphasized, and reiterated to the patient, to please not to follow the bowel prep instructions that comes with the bowel prep package.However, to please follow the prep instructions that will be received in the mail,or via the [employer] portal, or by both modes of delivery, which ever method of delivery the patient prefers,from the [facility]   Patient acknowledges understanding Prep instructions as explained and discussed on the phone.. Patient was advised the Colonoscopy Prep instructions discussed and explained on the phone,are being mailed out to the patient's verified address on file,or put onto the [employer] prefers. Patient's address on file was verified with the patient for accuracy of mailing. Patient's medications on file was reviewed with the patient for accuracy of information. Patient denies taking  any other medications other than those listed and verified on medication profile.Patient was explained the Colonoscopy will be performed here at [facility]. Patient was further explained the Pre-Op will call one day prior to the procedure date, to discuss Pre-Op instructions;and what time to report for the Colonoscopy. The patient was given the opportunity to ask any questions about the Colonoscopy. No further issues were discussed.

## 2023-03-30 NOTE — TELEPHONE ENCOUNTER
The patient has been advised the Colonoscopy Prep Kit will be ordered from the patient's verified preferred pharmacy on file. The medication can  be picked up by the patient, or the patient's designated representative.The patient was further explained the Colonoscopy Prep instructions will be mailed to the patient verified mailing address on file, or put onto the [employer] portal, whichever method of delivery the patient prefers.Additionally this patient was informed,not to follow the instructions that comes with the bowel prep medication. However, the patient was instructed to please follow the Colonoscopy Bowel Prep instructions that's being provided by the . The patient was asked to please to follow the Colonoscopy Prep instructions being provided as precisely,and  meticulously as possible.The patient was advised you  will receive a follow up phone call to summarize the Colonoscopy Prep instructions prior to the scheduled Colonoscopy procedure date. At this time the patient will be given an opportunity to ask any questions regarding the Colonoscopy procedure, and it's associated Bowel Prep instructions. The patient advised she is still in  possession the Sutab  bowel prep medication that was ordered for the previous canceled Colonoscopy The patent did request to be mailed out another copy of the Sutab prep instructions. The Subab instructions are being mailed to the patient as requested

## 2023-04-12 ENCOUNTER — TELEPHONE (OUTPATIENT)
Dept: CARDIOLOGY | Facility: CLINIC | Age: 48
End: 2023-04-12
Payer: MEDICARE

## 2023-04-12 NOTE — TELEPHONE ENCOUNTER
LOV 02/23/2023    [facility] for patient, returning your call.  My callback is [phone].

## 2023-04-24 ENCOUNTER — TELEPHONE (OUTPATIENT)
Dept: CARDIOLOGY | Facility: CLINIC | Age: 48
End: 2023-04-24
Payer: MEDICARE

## 2023-04-24 NOTE — TELEPHONE ENCOUNTER
----- Message from Dolly Robles sent at 4/24/2023  4:36 PM CDT -----  Regarding: pt advice  Contact: pt @ [phone]  Pt is calling requesting a call back from a RN in office, please call to advise further, thank you for all you do.

## 2023-04-26 ENCOUNTER — TELEPHONE (OUTPATIENT)
Dept: CARDIOLOGY | Facility: CLINIC | Age: 48
End: 2023-04-26
Payer: MEDICARE

## 2023-04-26 NOTE — TELEPHONE ENCOUNTER
Returned patient's call, left voicemail asking for a call back.  Call back number supplied on voicemail.

## 2023-04-26 NOTE — TELEPHONE ENCOUNTER
----- Message from Tremontana Chevalier sent at 4/26/2023 10:40 AM CDT -----  Regarding: pt advice  Consult/Advisory    Name Of Caller: Gay      Contact Preference:  [phone]    Nature of call: Pt says she is calling about her mother, a potential pt, and has questions regarding medical treatment.

## 2023-05-17 ENCOUNTER — OFFICE VISIT (OUTPATIENT)
Dept: CARDIOLOGY | Facility: CLINIC | Age: 48
End: 2023-05-17
Payer: MEDICARE

## 2023-05-17 VITALS
HEART RATE: 81 BPM | SYSTOLIC BLOOD PRESSURE: 137 MMHG | WEIGHT: 134.13 LBS | DIASTOLIC BLOOD PRESSURE: 80 MMHG | BODY MASS INDEX: 25.35 KG/M2

## 2023-05-17 DIAGNOSIS — I51.81 STRESS-INDUCED CARDIOMYOPATHY: ICD-10-CM

## 2023-05-17 DIAGNOSIS — R00.2 PALPITATIONS: ICD-10-CM

## 2023-05-17 DIAGNOSIS — L30.8 DERMATITIS ASSOCIATED WITH MOISTURE: Primary | ICD-10-CM

## 2023-05-17 PROCEDURE — 1160F RVW MEDS BY RX/DR IN RCRD: CPT | Mod: CPTII,,, | Performed by: NURSE PRACTITIONER

## 2023-05-17 PROCEDURE — 3075F PR MOST RECENT SYSTOLIC BLOOD PRESS GE 130-139MM HG: ICD-10-PCS | Mod: CPTII,,, | Performed by: NURSE PRACTITIONER

## 2023-05-17 PROCEDURE — 1159F MED LIST DOCD IN RCRD: CPT | Mod: CPTII,,, | Performed by: NURSE PRACTITIONER

## 2023-05-17 PROCEDURE — 1160F PR REVIEW ALL MEDS BY PRESCRIBER/CLIN PHARMACIST DOCUMENTED: ICD-10-PCS | Mod: CPTII,,, | Performed by: NURSE PRACTITIONER

## 2023-05-17 PROCEDURE — 3079F DIAST BP 80-89 MM HG: CPT | Mod: CPTII,,, | Performed by: NURSE PRACTITIONER

## 2023-05-17 PROCEDURE — 3008F PR BODY MASS INDEX (BMI) DOCUMENTED: ICD-10-PCS | Mod: CPTII,,, | Performed by: NURSE PRACTITIONER

## 2023-05-17 PROCEDURE — 3008F BODY MASS INDEX DOCD: CPT | Mod: CPTII,,, | Performed by: NURSE PRACTITIONER

## 2023-05-17 PROCEDURE — 99213 PR OFFICE/OUTPT VISIT, EST, LEVL III, 20-29 MIN: ICD-10-PCS | Mod: ,,, | Performed by: NURSE PRACTITIONER

## 2023-05-17 PROCEDURE — 1159F PR MEDICATION LIST DOCUMENTED IN MEDICAL RECORD: ICD-10-PCS | Mod: CPTII,,, | Performed by: NURSE PRACTITIONER

## 2023-05-17 PROCEDURE — 99999 PR PBB SHADOW E&M-EST. PATIENT-LVL IV: ICD-10-PCS | Mod: PBBFAC,,, | Performed by: NURSE PRACTITIONER

## 2023-05-17 PROCEDURE — 99999 PR PBB SHADOW E&M-EST. PATIENT-LVL IV: CPT | Mod: PBBFAC,,, | Performed by: NURSE PRACTITIONER

## 2023-05-17 PROCEDURE — 99213 OFFICE O/P EST LOW 20 MIN: CPT | Mod: ,,, | Performed by: NURSE PRACTITIONER

## 2023-05-17 PROCEDURE — 3079F PR MOST RECENT DIASTOLIC BLOOD PRESSURE 80-89 MM HG: ICD-10-PCS | Mod: CPTII,,, | Performed by: NURSE PRACTITIONER

## 2023-05-17 PROCEDURE — 3075F SYST BP GE 130 - 139MM HG: CPT | Mod: CPTII,,, | Performed by: NURSE PRACTITIONER

## 2023-05-17 RX ORDER — NYSTATIN 100000 [USP'U]/G
POWDER TOPICAL 2 TIMES DAILY
Qty: 60 G | Refills: 1 | Status: SHIPPED | OUTPATIENT
Start: 2023-05-17 | End: 2023-09-15

## 2023-05-17 NOTE — PATIENT INSTRUCTIONS
I would recommend continuing the lopressor 12.5 mg twice a day.  Ok to hold in evening if blood pressure is low    I sent over some nystatin powder for you- it does need a prior authorization so if it takes too long let us know and we can send over the cream.    Try an over-the-counter antihistamine to help with the itching    At your next visit if your blood pressure is still stable we will start another medication to help support the heart
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
## 2023-08-01 ENCOUNTER — OFFICE VISIT (OUTPATIENT)
Dept: CARDIOLOGY | Facility: CLINIC | Age: 48
End: 2023-08-01
Payer: MEDICARE

## 2023-08-01 VITALS
WEIGHT: 135.5 LBS | OXYGEN SATURATION: 96 % | BODY MASS INDEX: 25.58 KG/M2 | HEART RATE: 92 BPM | DIASTOLIC BLOOD PRESSURE: 79 MMHG | SYSTOLIC BLOOD PRESSURE: 112 MMHG | HEIGHT: 61 IN

## 2023-08-01 DIAGNOSIS — I51.81 STRESS-INDUCED CARDIOMYOPATHY: Primary | ICD-10-CM

## 2023-08-01 PROCEDURE — 3078F PR MOST RECENT DIASTOLIC BLOOD PRESSURE < 80 MM HG: ICD-10-PCS | Mod: CPTII,S$GLB,, | Performed by: NURSE PRACTITIONER

## 2023-08-01 PROCEDURE — 93000 EKG 12-LEAD: ICD-10-PCS | Mod: S$GLB,,, | Performed by: INTERNAL MEDICINE

## 2023-08-01 PROCEDURE — 99212 OFFICE O/P EST SF 10 MIN: CPT | Mod: 25,S$GLB,, | Performed by: NURSE PRACTITIONER

## 2023-08-01 PROCEDURE — 1159F PR MEDICATION LIST DOCUMENTED IN MEDICAL RECORD: ICD-10-PCS | Mod: CPTII,S$GLB,, | Performed by: NURSE PRACTITIONER

## 2023-08-01 PROCEDURE — 93000 ELECTROCARDIOGRAM COMPLETE: CPT | Mod: S$GLB,,, | Performed by: INTERNAL MEDICINE

## 2023-08-01 PROCEDURE — 3008F BODY MASS INDEX DOCD: CPT | Mod: CPTII,S$GLB,, | Performed by: NURSE PRACTITIONER

## 2023-08-01 PROCEDURE — 99212 PR OFFICE/OUTPT VISIT, EST, LEVL II, 10-19 MIN: ICD-10-PCS | Mod: 25,S$GLB,, | Performed by: NURSE PRACTITIONER

## 2023-08-01 PROCEDURE — 1160F RVW MEDS BY RX/DR IN RCRD: CPT | Mod: CPTII,S$GLB,, | Performed by: NURSE PRACTITIONER

## 2023-08-01 PROCEDURE — 1160F PR REVIEW ALL MEDS BY PRESCRIBER/CLIN PHARMACIST DOCUMENTED: ICD-10-PCS | Mod: CPTII,S$GLB,, | Performed by: NURSE PRACTITIONER

## 2023-08-01 PROCEDURE — 99999 PR PBB SHADOW E&M-EST. PATIENT-LVL IV: ICD-10-PCS | Mod: PBBFAC,,, | Performed by: NURSE PRACTITIONER

## 2023-08-01 PROCEDURE — 1159F MED LIST DOCD IN RCRD: CPT | Mod: CPTII,S$GLB,, | Performed by: NURSE PRACTITIONER

## 2023-08-01 PROCEDURE — 3078F DIAST BP <80 MM HG: CPT | Mod: CPTII,S$GLB,, | Performed by: NURSE PRACTITIONER

## 2023-08-01 PROCEDURE — 99999 PR PBB SHADOW E&M-EST. PATIENT-LVL IV: CPT | Mod: PBBFAC,,, | Performed by: NURSE PRACTITIONER

## 2023-08-01 PROCEDURE — 3074F SYST BP LT 130 MM HG: CPT | Mod: CPTII,S$GLB,, | Performed by: NURSE PRACTITIONER

## 2023-08-01 PROCEDURE — 3074F PR MOST RECENT SYSTOLIC BLOOD PRESSURE < 130 MM HG: ICD-10-PCS | Mod: CPTII,S$GLB,, | Performed by: NURSE PRACTITIONER

## 2023-08-01 PROCEDURE — 3008F PR BODY MASS INDEX (BMI) DOCUMENTED: ICD-10-PCS | Mod: CPTII,S$GLB,, | Performed by: NURSE PRACTITIONER

## 2023-08-01 NOTE — PROGRESS NOTES
"        Cardiology    8/1/2023  10:21 AM    Problem list  Patient Active Problem List   Diagnosis    Traumatic complete tear of right rotator cuff    Palpitations    Thyroid nodule    Recurrent major depressive disorder, in partial remission    Acute recurrent maxillary sinusitis    Mixed hyperlipidemia    Lumbar disc disease    History of partial thyroidectomy    Fall    Stress-induced cardiomyopathy    Acute systolic congestive heart failure    Dermatitis associated with moisture       CC:  Stress-induced cardiomyopathy    HPI:  Skin irritation has improved.  Her heart will fluctuate and "her blood flows" and her mind wanders and she notices the sensation.  Sometimes when she takes trazadone she will have to get up and eat and then the sun has come up and the medication hasn't worked at all.  No chest pain.  She feeds raccoons and had some dreams that they were bathing in the washing machine.  She is concerned about the dream. She reports feeling well overall- no complaints.    Medications  Current Outpatient Medications   Medication Sig Dispense Refill    aspirin (ECOTRIN) 81 MG EC tablet Take 1 tablet (81 mg total) by mouth once daily. 30 tablet 3    atorvastatin (LIPITOR) 80 MG tablet TAKE ONE TABLET BY MOUTH EACH EVENING FOR CHOLESTEROL 30 tablet 6    ergocalciferol (VITAMIN D2) 50,000 unit Cap TAKE 1 CAPSULE BY MOUTH TWICE WEEKLY AS DIRECTED 8 capsule 3    FLUoxetine 20 MG capsule TAKE 1 CAPSULE BY MOUTH THREE TIMES DAILY 90 capsule 3    fluticasone propionate (FLONASE) 50 mcg/actuation nasal spray Pt takes PRN when she has Rx      gabapentin (NEURONTIN) 300 MG capsule TAKE 1 CAPSULE (300 MG TOTAL) BY MOUTH 3 (THREE) TIMES DAILY. 90 capsule 3    HYDROcodone-acetaminophen (NORCO)  mg per tablet Take 1 tablet by mouth 2 (two) times a day.      methocarbamoL (ROBAXIN) 500 MG Tab TAKE ONE TABLET BY MOUTH TWICE DAILY 60 tablet 3    metoprolol tartrate (LOPRESSOR) 25 MG tablet Take 25 mg by mouth once daily.  "     nystatin (MYCOSTATIN) powder Apply topically 2 (two) times daily. 60 g 1    sod picosulf-mag ox-citric ac (CLENPIQ) 10 mg-3.5 gram- 12 gram/160 mL Soln Take 160 mLs by mouth as needed. 320 mL 0    traZODone (DESYREL) 50 MG tablet TAKE 1 TO 2 TABLETS BY MOUTH EACH NIGHT AT BEDTIME (Patient taking differently: TAKE 1 TO 2 TABLETS BY MOUTH EACH NIGHT AT BEDTIME  Pt takes PRN) 60 tablet 3     No current facility-administered medications for this visit.     Facility-Administered Medications Ordered in Other Visits   Medication Dose Route Frequency Provider Last Rate Last Admin    lactated ringers bolus 1,000 mL  1,000 mL Intravenous Once Jorge Luis Vaughan MD        LIDOcaine (PF) 10 mg/ml (1%) injection 10 mg  1 mL Intradermal Once PRN Jorge Luis Vaughan MD          Prior to Admission medications    Medication Sig Start Date End Date Taking? Authorizing Provider   aspirin (ECOTRIN) 81 MG EC tablet Take 1 tablet (81 mg total) by mouth once daily. 12/6/22 12/6/23 Yes Kenzie Goff NP   atorvastatin (LIPITOR) 80 MG tablet TAKE ONE TABLET BY MOUTH EACH EVENING FOR CHOLESTEROL 6/8/23  Yes Beni Blount MD   ergocalciferol (VITAMIN D2) 50,000 unit Cap TAKE 1 CAPSULE BY MOUTH TWICE WEEKLY AS DIRECTED 7/17/23  Yes Beni Blount MD   FLUoxetine 20 MG capsule TAKE 1 CAPSULE BY MOUTH THREE TIMES DAILY 6/8/23  Yes Beni Blount MD   fluticasone propionate (FLONASE) 50 mcg/actuation nasal spray Pt takes PRN when she has Rx 8/10/22  Yes Historical Provider   gabapentin (NEURONTIN) 300 MG capsule TAKE 1 CAPSULE (300 MG TOTAL) BY MOUTH 3 (THREE) TIMES DAILY. 7/17/23  Yes Beni Blount MD   HYDROcodone-acetaminophen (NORCO)  mg per tablet Take 1 tablet by mouth 2 (two) times a day. 4/13/22  Yes Historical Provider   methocarbamoL (ROBAXIN) 500 MG Tab TAKE ONE TABLET BY MOUTH TWICE DAILY 6/29/23  Yes Beni Blount MD   metoprolol tartrate (LOPRESSOR) 25 MG tablet Take 25 mg by mouth once  daily.   Yes Historical Provider   nystatin (MYCOSTATIN) powder Apply topically 2 (two) times daily. 5/17/23  Yes Hillary Mejias DNP   sod picosulf-mag ox-citric ac (CLENPIQ) 10 mg-3.5 gram- 12 gram/160 mL Soln Take 160 mLs by mouth as needed. 3/30/23  Yes Jorge Luis Vaughan MD   traZODone (DESYREL) 50 MG tablet TAKE 1 TO 2 TABLETS BY MOUTH EACH NIGHT AT BEDTIME  Patient taking differently: TAKE 1 TO 2 TABLETS BY MOUTH EACH NIGHT AT BEDTIME  Pt takes PRN 9/12/22  Yes Beni Blount MD         History  Past Medical History:   Diagnosis Date    Cardiomyopathy     stress induced    NSTEMI (non-ST elevated myocardial infarction) 12/01/2022    Thyroid nodule 03/10/2022     Past Surgical History:   Procedure Laterality Date    BREAST LUMPECTOMY      COLONOSCOPY N/A 5/1/2023    Procedure: COLONOSCOPY;  Surgeon: Jorge Luis Vaughan MD;  [facility];  Service: Endoscopy;  Laterality: N/A;    COLONOSCOPY W/ POLYPECTOMY  05/01/2023    polyps x2    HYSTERECTOMY       Social History     Socioeconomic History    Marital status: Single   Tobacco Use    Smoking status: Light Smoker     Current packs/day: 0.00     Types: Vaping with nicotine    Smokeless tobacco: Never   Substance and Sexual Activity    Alcohol use: No    Drug use: No     Social Determinants of Health     Financial Resource Strain: Low Risk  (12/2/2022)    Overall Financial Resource Strain (CARDIA)     Difficulty of Paying Living Expenses: Not hard at all   Food Insecurity: No Food Insecurity (12/2/2022)    Hunger Vital Sign     Worried About Running Out of Food in the Last Year: Never true     Ran Out of Food in the Last Year: Never true   Transportation Needs: No Transportation Needs (12/2/2022)    PRAPARE - Transportation     Lack of Transportation (Medical): No     Lack of Transportation (Non-Medical): No   Physical Activity: Unknown (12/2/2022)    Exercise Vital Sign     Days of Exercise per Week: 7 days   Stress: No Stress Concern  Present (12/2/2022)    [employer] - Occupational Stress Questionnaire     Feeling of Stress : Only a little   Social Connections: Moderately Integrated (12/2/2022)    Social Connection and Isolation Panel [NHANES]     Frequency of Communication with Friends and Family: More than three times a week     Frequency of Social Gatherings with Friends and Family: More than three times a week     Attends Y[employer] Services: 1 to 4 times per year     Active Member of Clubs or Organizations: Yes     Attends Club or Organization Meetings: 1 to 4 times per year     Marital Status: Never    Housing Stability: Low Risk  (12/2/2022)    Housing Stability Vital Sign     Unable to Pay for Housing in the Last Year: No     Number of Places Lived in the Last Year: 1     Unstable Housing in the Last Year: No         Allergies  Review of patient's allergies indicates:   Allergen Reactions    Benadryl [diphenhydramine hcl] Other (See Comments)    Morphine Swelling    Penicillins Other (See Comments)         Review of Systems   Review of Systems   Constitutional: Negative for diaphoresis and malaise/fatigue.   HENT: Negative.     Cardiovascular:  Positive for palpitations. Negative for chest pain, claudication, dyspnea on exertion, irregular heartbeat, leg swelling, near-syncope, orthopnea, paroxysmal nocturnal dyspnea and syncope.   Respiratory:  Negative for shortness of breath.    Endocrine: Negative for polydipsia, polyphagia and polyuria.   Hematologic/Lymphatic: Does not bruise/bleed easily.   Gastrointestinal:  Negative for bloating, nausea and vomiting.   Genitourinary: Negative.    Neurological:  Negative for excessive daytime sleepiness, dizziness, light-headedness, loss of balance and weakness.   Psychiatric/Behavioral:  The patient is not nervous/anxious.    Allergic/Immunologic: Negative.        Physical Exam  Wt Readings from Last 1 Encounters:   08/01/23 61.4 kg (135 lb 7.6 oz)     BP  Readings from Last 3 Encounters:   08/01/23 112/79   05/17/23 137/80   05/15/23 122/83     Pulse Readings from Last 1 Encounters:   08/01/23 92     Body mass index is 25.6 kg/m².    Physical Exam  Vitals and nursing note reviewed.   Constitutional:       Appearance: Normal appearance.   HENT:      Head: Normocephalic and atraumatic.      Mouth/Throat:      Mouth: Mucous membranes are moist.   Eyes:      Pupils: Pupils are equal, round, and reactive to light.   Cardiovascular:      Rate and Rhythm: Normal rate and regular rhythm.      Pulses:           Radial pulses are 2+ on the right side and 2+ on the left side.        Dorsalis pedis pulses are 2+ on the right side and 2+ on the left side.        Posterior tibial pulses are 2+ on the right side and 2+ on the left side.      Heart sounds: No murmur heard.     Gallop present.   Pulmonary:      Effort: Pulmonary effort is normal. No respiratory distress.      Breath sounds: Normal breath sounds.   Abdominal:      General: There is no distension.      Tenderness: There is no abdominal tenderness.   Musculoskeletal:      Cervical back: Normal range of motion.      Right lower leg: No edema.      Left lower leg: No edema.   Skin:     General: Skin is warm and dry.      Findings: No erythema.   Neurological:      General: No focal deficit present.      Mental Status: She is alert.   Psychiatric:         Mood and Affect: Mood normal.         Behavior: Behavior normal.             Problem List Items Addressed This Visit          Cardiac/Vascular    Stress-induced cardiomyopathy - Primary    Overview     Continue Lopressor 12.5 mg BID  Recommend titrating GDMT, pt continues to decline at this time         Relevant Orders    IN OFFICE EKG 12-LEAD (to [place]) (Completed)       Follow Up  3-6 months      @Hillary Mejias DNP

## 2023-08-01 NOTE — PATIENT INSTRUCTIONS
Continue your metoprolol    Let us know if you have any problems with swelling, problems lying flat or chest pain    We will see you back in 3 months    Get your labs

## 2023-09-14 DIAGNOSIS — L30.8 DERMATITIS ASSOCIATED WITH MOISTURE: ICD-10-CM

## 2023-09-15 RX ORDER — NYSTATIN 100000 [USP'U]/G
POWDER TOPICAL 2 TIMES DAILY
Qty: 60 G | Refills: 1 | Status: SHIPPED | OUTPATIENT
Start: 2023-09-15

## 2023-09-18 ENCOUNTER — PATIENT MESSAGE (OUTPATIENT)
Dept: PEDIATRICS | Facility: CLINIC | Age: 48
End: 2023-09-18
Payer: MEDICARE

## 2023-10-17 ENCOUNTER — PATIENT MESSAGE (OUTPATIENT)
Dept: PODIATRY | Facility: CLINIC | Age: 48
End: 2023-10-17
Payer: MEDICARE

## 2023-10-24 ENCOUNTER — PATIENT MESSAGE (OUTPATIENT)
Dept: GASTROENTEROLOGY | Facility: CLINIC | Age: 48
End: 2023-10-24
Payer: MEDICARE

## 2023-10-25 ENCOUNTER — TELEPHONE (OUTPATIENT)
Dept: SURGERY | Facility: CLINIC | Age: 48
End: 2023-10-25
Payer: MEDICARE

## 2023-10-25 ENCOUNTER — PATIENT MESSAGE (OUTPATIENT)
Dept: SURGERY | Facility: CLINIC | Age: 48
End: 2023-10-25
Payer: MEDICARE

## 2023-10-25 NOTE — TELEPHONE ENCOUNTER
A case request was received to schedule this patient [place] [facility] The patient verbally consented  to have the EGD, and is scheduled to have the EGD on the date of request 11/06/2023. The patient was explained EGD prep instructions as follows:    EGD Prep Instructions    [facility]  [address]    You are scheduled for an EGD with Dr. Jose Manuel Lopez MD on Monday 11/06/2023 at [facility], located [address]  Check in at the admit desk, first floor of the hospital (which is the building on the left).   You will receive a call 2-3 days before your EGD to tell you the time to arrive.  If you have not received a call by the day before your procedure, call the Endoscopy Lab at [phone]    Nothing to eat or drink after midnight before the procedure.  You MAY brush your teeth.    You MAY take your blood pressure, heart, and seizure medication on the morning of the procedure, with a SIP of water.  Hold ALL other medications until after the procedure.    If you are on blood thinners THAT YOU HAVE BEEN INSTRUCTED TO HOLD BY YOUR DOCTOR FOR THIS PROCEDURE, thppen do NOT take this the morning of your EGD.  Do NOT stop these medications on your own, they must be approved to be held by your doctor.  Your EGD can NOT be done if you are on these medications.  Examples of blood thinners include: Coumadin, Aggrenox, Plavix, Pradaxa, Reapro, Pletal, Xarelto, Ticagrelor, Brilinta, Eliquis, and high dose aspirin (325 mg).  You do not have to stop baby aspirin 81 mg.    Please make sure that you have a  home because you will receive an IV sedation. You may NOT take an uber, lyft, taxi, or bus home unless you have a responsible adult with you.     You will receive a call 2-3 days before your EGD to tell you the time to arrive.  If you have not received a call by the day before your procedure, call the Endoscopy department at [phone].      The patient  was given an opportunity to ask any questions about the EGD Prep Instructions

## 2023-11-06 PROBLEM — R10.10 UPPER ABDOMINAL PAIN: Status: ACTIVE | Noted: 2023-11-06

## 2023-11-17 RX ORDER — METOPROLOL TARTRATE 25 MG/1
12.5 TABLET, FILM COATED ORAL 2 TIMES DAILY
Qty: 30 TABLET | Refills: 11 | Status: SHIPPED | OUTPATIENT
Start: 2023-11-17

## 2024-01-03 ENCOUNTER — OFFICE VISIT (OUTPATIENT)
Dept: CARDIOLOGY | Facility: CLINIC | Age: 49
End: 2024-01-03
Payer: MEDICARE

## 2024-01-03 VITALS
DIASTOLIC BLOOD PRESSURE: 86 MMHG | SYSTOLIC BLOOD PRESSURE: 123 MMHG | HEIGHT: 61 IN | BODY MASS INDEX: 24.64 KG/M2 | WEIGHT: 130.5 LBS | OXYGEN SATURATION: 98 % | HEART RATE: 95 BPM

## 2024-01-03 DIAGNOSIS — I51.81 STRESS-INDUCED CARDIOMYOPATHY: Primary | ICD-10-CM

## 2024-01-03 DIAGNOSIS — I50.21 ACUTE SYSTOLIC CONGESTIVE HEART FAILURE: ICD-10-CM

## 2024-01-03 PROCEDURE — 3074F SYST BP LT 130 MM HG: CPT | Mod: CPTII,S$GLB,, | Performed by: NURSE PRACTITIONER

## 2024-01-03 PROCEDURE — 99999 PR PBB SHADOW E&M-EST. PATIENT-LVL IV: CPT | Mod: PBBFAC,,, | Performed by: NURSE PRACTITIONER

## 2024-01-03 PROCEDURE — 1159F MED LIST DOCD IN RCRD: CPT | Mod: CPTII,S$GLB,, | Performed by: NURSE PRACTITIONER

## 2024-01-03 PROCEDURE — 1160F RVW MEDS BY RX/DR IN RCRD: CPT | Mod: CPTII,S$GLB,, | Performed by: NURSE PRACTITIONER

## 2024-01-03 PROCEDURE — 3079F DIAST BP 80-89 MM HG: CPT | Mod: CPTII,S$GLB,, | Performed by: NURSE PRACTITIONER

## 2024-01-03 PROCEDURE — 3008F BODY MASS INDEX DOCD: CPT | Mod: CPTII,S$GLB,, | Performed by: NURSE PRACTITIONER

## 2024-01-03 PROCEDURE — 93010 ELECTROCARDIOGRAM REPORT: CPT | Mod: S$GLB,,, | Performed by: INTERNAL MEDICINE

## 2024-01-03 PROCEDURE — 99213 OFFICE O/P EST LOW 20 MIN: CPT | Mod: S$GLB,,, | Performed by: NURSE PRACTITIONER

## 2024-01-03 PROCEDURE — 93005 ELECTROCARDIOGRAM TRACING: CPT | Mod: S$GLB,,, | Performed by: NURSE PRACTITIONER

## 2024-01-03 NOTE — PATIENT INSTRUCTIONS
Continue your current medications    I would recommend taking your metoprolol half a tablet twice daily.      Let us know how you are doing     Continue to work on quitting smoking.  Limiting caffeine is recommended when you can    I would recommend getting the echo that Dr. Blount ordered

## 2024-01-03 NOTE — PROGRESS NOTES
Cardiology    1/3/2024  1:40 PM    Problem list  Patient Active Problem List   Diagnosis    Traumatic complete tear of right rotator cuff    Palpitations    Thyroid nodule    Recurrent major depressive disorder, in partial remission    Acute recurrent maxillary sinusitis    Mixed hyperlipidemia    Lumbar disc disease    History of partial thyroidectomy    Fall    Stress-induced cardiomyopathy    Acute systolic congestive heart failure    Dermatitis associated with moisture    Upper abdominal pain       CC:  HFrEF with recovered EF    HPI:  Taking one whole tablet of metoprolol daily instead of 12.5 mg BID.  She will feel her heart beat change sometimes and will control her breathing and this helps. No dizziness or lightheadedness.  Has discomfort in her right ribs at times- turns out that it was likely related to a stomach virus.  Pain did not feel like when she had her admission. Sleeps on a body pillow that she wraps in a [place] and she lays on her side on an angle. If she lays on her back she will wake up choking as she has nasal drainage.  Dr. Ha had ordered a repeat echo in October 2023 and it is pending. She had a death in the family as well as many doctors appointments she was unable to make it.  No peripheral edema    Medications  Current Outpatient Medications   Medication Sig Dispense Refill    aspirin (ECOTRIN) 81 MG EC tablet Take 1 tablet (81 mg total) by mouth once daily. 30 tablet 3    atorvastatin (LIPITOR) 80 MG tablet TAKE ONE TABLET BY MOUTH EACH EVENING FOR CHOLESTEROL 30 tablet 6    ergocalciferol (VITAMIN D2) 50,000 unit Cap TAKE 1 CAPSULE BY MOUTH TWICE WEEKLY AS DIRECTED 8 capsule 3    FLUoxetine 20 MG capsule TAKE 1 CAPSULE BY MOUTH THREE TIMES DAILY 90 capsule 3    fluticasone propionate (FLONASE) 50 mcg/actuation nasal spray Pt takes PRN when she has Rx      gabapentin (NEURONTIN) 300 MG capsule TAKE 1 CAPSULE (300 MG TOTAL) BY MOUTH 3 (THREE) TIMES DAILY. (Patient taking  differently: Take 300 mg by mouth 3 (three) times daily. PRN) 90 capsule 3    HYDROcodone-acetaminophen (NORCO)  mg per tablet Take 1 tablet by mouth 2 (two) times a day.      methocarbamoL (ROBAXIN) 500 MG Tab TAKE ONE TABLET BY MOUTH TWICE DAILY 60 tablet 3    metoprolol tartrate (LOPRESSOR) 25 MG tablet TAKE 0.5 TABLETS (12.5 MG TOTAL) BY MOUTH 2 (TWO) TIMES DAILY. (Patient taking differently: Take 12.5 mg by mouth 2 (two) times daily. Taking whole pill once a day) 30 tablet 11    NYAMYC powder APPLY TO AFFECTED AREA(S) TWICE DAILY 60 g 1    traZODone (DESYREL) 50 MG tablet TAKE 1 TO 2 TABLETS BY MOUTH EACH NIGHT AT BEDTIME (Patient taking differently: TAKE 1 TO 2 TABLETS BY MOUTH EACH NIGHT AT BEDTIME  Pt takes PRN) 60 tablet 3    sod picosulf-mag ox-citric ac (CLENPIQ) 10 mg-3.5 gram- 12 gram/160 mL Soln Take 160 mLs by mouth as needed. (Patient not taking: Reported on 1/3/2024) 320 mL 0     No current facility-administered medications for this visit.     Facility-Administered Medications Ordered in Other Visits   Medication Dose Route Frequency Provider Last Rate Last Admin    lactated ringers bolus 1,000 mL  1,000 mL Intravenous Once Jorge Luis Vaughan MD        LIDOcaine (PF) 10 mg/ml (1%) injection 10 mg  1 mL Intradermal Once PRN Jorge Luis Vaughan MD          Prior to Admission medications    Medication Sig Start Date End Date Taking? Authorizing Provider   aspirin (ECOTRIN) 81 MG EC tablet Take 1 tablet (81 mg total) by mouth once daily. 12/6/22 1/3/24 Yes Kenzie Goff NP   atorvastatin (LIPITOR) 80 MG tablet TAKE ONE TABLET BY MOUTH EACH EVENING FOR CHOLESTEROL 6/8/23  Yes Beni Blount MD   ergocalciferol (VITAMIN D2) 50,000 unit Cap TAKE 1 CAPSULE BY MOUTH TWICE WEEKLY AS DIRECTED 7/17/23  Yes Beni Blount MD   FLUoxetine 20 MG capsule TAKE 1 CAPSULE BY MOUTH THREE TIMES DAILY 9/25/23  Yes Beni Blount MD   fluticasone propionate (FLONASE) 50 mcg/actuation  nasal spray Pt takes PRN when she has Rx 8/10/22  Yes Provider, Historical   gabapentin (NEURONTIN) 300 MG capsule TAKE 1 CAPSULE (300 MG TOTAL) BY MOUTH 3 (THREE) TIMES DAILY.  Patient taking differently: Take 300 mg by mouth 3 (three) times daily. PRN 7/17/23  Yes Beni Blount MD   HYDROcodone-acetaminophen (NORCO)  mg per tablet Take 1 tablet by mouth 2 (two) times a day. 4/13/22  Yes Provider, Historical   methocarbamoL (ROBAXIN) 500 MG Tab TAKE ONE TABLET BY MOUTH TWICE DAILY 6/29/23  Yes Beni Blount MD   metoprolol tartrate (LOPRESSOR) 25 MG tablet TAKE 0.5 TABLETS (12.5 MG TOTAL) BY MOUTH 2 (TWO) TIMES DAILY.  Patient taking differently: Take 12.5 mg by mouth 2 (two) times daily. Taking whole pill once a day 11/17/23  Yes Hillary Mejias DNP   NYAMYC powder APPLY TO AFFECTED AREA(S) TWICE DAILY 9/15/23  Yes Hillary Mejias DNP   traZODone (DESYREL) 50 MG tablet TAKE 1 TO 2 TABLETS BY MOUTH EACH NIGHT AT BEDTIME  Patient taking differently: TAKE 1 TO 2 TABLETS BY MOUTH EACH NIGHT AT BEDTIME  Pt takes PRN 9/12/22  Yes Beni Blount MD   sod picosulf-mag ox-citric ac (CLENPIQ) 10 mg-3.5 gram- 12 gram/160 mL Soln Take 160 mLs by mouth as needed.  Patient not taking: Reported on 1/3/2024 3/30/23   Jorge Luis Vaughan MD         History  Past Medical History:   Diagnosis Date    Cardiomyopathy     stress induced    NSTEMI (non-ST elevated myocardial infarction) 12/01/2022    Thyroid nodule 03/10/2022     Past Surgical History:   Procedure Laterality Date    BREAST LUMPECTOMY      COLONOSCOPY N/A 05/01/2023    Procedure: COLONOSCOPY;  Surgeon: Jorge Luis Vaughan MD;  Location: [facility];  Service: Endoscopy;  Laterality: N/A;    COLONOSCOPY W/ POLYPECTOMY  05/01/2023    polyps x2    ESOPHAGOGASTRODUODENOSCOPY N/A 11/6/2023    Procedure: EGD (ESOPHAGOGASTRODUODENOSCOPY);  Surgeon: Jose Manuel Lopez MD;  Location: [facility];  Service: Endoscopy;  Laterality: N/A;    HYSTERECTOMY       LYMPHADENECTOMY Left      Social History     Socioeconomic History    Marital status: Single   Tobacco Use    Smoking status: Light Smoker     Types: Vaping with nicotine    Smokeless tobacco: Never   Substance and Sexual Activity    Alcohol use: No    Drug use: No     Social Determinants of Health     Financial Resource Strain: Low Risk  (12/2/2022)    Overall Financial Resource Strain (CARDIA)     Difficulty of Paying Living Expenses: Not hard at all   Food Insecurity: No Food Insecurity (12/2/2022)    Hunger Vital Sign     Worried About Running Out of Food in the Last Year: Never true     Ran Out of Food in the Last Year: Never true   Transportation Needs: No Transportation Needs (12/2/2022)    PRAPARE - Transportation     Lack of Transportation (Medical): No     Lack of Transportation (Non-Medical): No   Physical Activity: Unknown (12/2/2022)    Exercise Vital Sign     Days of Exercise per Week: 7 days   Stress: No Stress Concern Present (12/2/2022)    [employer] - Occupational Stress Questionnaire     Feeling of Stress : Only a little   Social Connections: Moderately Integrated (12/2/2022)    Social Connection and Isolation Panel [NHANES]     Frequency of Communication with Friends and Family: More than three times a week     Frequency of Social Gatherings with Friends and Family: More than three times a week     Attends [employer] Services: 1 to 4 times per year     Active Member of Clubs or Organizations: Yes     Attends Club or Organization Meetings: 1 to 4 times per year     Marital Status: Never    Housing Stability: Low Risk  (12/2/2022)    Housing Stability Vital Sign     Unable to Pay for Housing in the Last Year: No     Number of Places Lived in the Last Year: 1     Unstable Housing in the Last Year: No         Allergies  Review of patient's allergies indicates:   Allergen Reactions    Benadryl [diphenhydramine hcl] Other (See Comments)    Morphine Swelling     Penicillins Other (See Comments)         Review of Systems   Review of Systems   Constitutional: Negative for diaphoresis and malaise/fatigue.   HENT: Negative.     Cardiovascular:  Positive for irregular heartbeat (at bedtime sometimes). Negative for chest pain, claudication, dyspnea on exertion, leg swelling, near-syncope, orthopnea, palpitations, paroxysmal nocturnal dyspnea and syncope.   Respiratory:  Negative for shortness of breath.    Endocrine: Negative for polydipsia, polyphagia and polyuria.   Hematologic/Lymphatic: Does not bruise/bleed easily.   Gastrointestinal:  Negative for bloating, nausea and vomiting.   Genitourinary: Negative.    Neurological:  Negative for excessive daytime sleepiness, dizziness, light-headedness, loss of balance and weakness.   Psychiatric/Behavioral:  The patient is not nervous/anxious.    Allergic/Immunologic: Negative.          Physical Exam  Wt Readings from Last 1 Encounters:   01/03/24 59.2 kg (130 lb 8.2 oz)     BP Readings from Last 3 Encounters:   01/03/24 123/86   12/18/23 121/82   11/16/23 127/85     Pulse Readings from Last 1 Encounters:   01/03/24 95     Body mass index is 24.66 kg/m².    Physical Exam  Vitals and nursing note reviewed.   Constitutional:       Appearance: Normal appearance.   HENT:      Head: Normocephalic and atraumatic.      Mouth/Throat:      Mouth: Mucous membranes are moist.   Eyes:      Pupils: Pupils are equal, round, and reactive to light.   Cardiovascular:      Rate and Rhythm: Normal rate and regular rhythm.      Pulses:           Radial pulses are 2+ on the right side and 2+ on the left side.        Dorsalis pedis pulses are 2+ on the right side and 2+ on the left side.        Posterior tibial pulses are 2+ on the right side and 2+ on the left side.      Heart sounds: No murmur heard.  Pulmonary:      Effort: Pulmonary effort is normal. No respiratory distress.      Breath sounds: Normal breath sounds.   Abdominal:      General: There is  no distension.      Tenderness: There is no abdominal tenderness.   Musculoskeletal:      Cervical back: Normal range of motion.      Right lower leg: No edema.      Left lower leg: No edema.   Skin:     General: Skin is warm and dry.      Findings: No erythema.   Neurological:      General: No focal deficit present.      Mental Status: She is alert.   Psychiatric:         Mood and Affect: Mood normal.         Behavior: Behavior normal.         Problem List Items Addressed This Visit          Cardiac/Vascular    Stress-induced cardiomyopathy - Primary    Overview     Continue Lopressor 12.5 mg BID (had been taking a whole one QD)  Recommend titrating GDMT, pt continues to decline at this time  Repeat echo as ordered by Dr. Blount         Current Assessment & Plan     As above         Relevant Orders    IN OFFICE EKG 12-LEAD (to [place]) (Completed)    Acute systolic congestive heart failure     The total time spent for evaluation and management on 01/13/2024 including reviewing separately obtained history, performing a medically appropriate exam and evaluation, documenting clinical information in the health record, independently interpreting results and communicating them to the patient/family/caregiver, and ordering medications/tests/procedures was >35 minutes.                  @Hillary Mejias DNP

## 2024-01-23 ENCOUNTER — HOSPITAL ENCOUNTER (OUTPATIENT)
Dept: RADIOLOGY | Facility: HOSPITAL | Age: 49
Discharge: HOME | End: 2024-01-23
Attending: INTERNAL MEDICINE
Payer: MEDICARE

## 2024-01-23 DIAGNOSIS — Z12.31 SCREENING MAMMOGRAM FOR BREAST CANCER: ICD-10-CM

## 2024-01-23 PROCEDURE — 77063 BREAST TOMOSYNTHESIS BI: CPT | Mod: 26,,, | Performed by: RADIOLOGY

## 2024-01-23 PROCEDURE — 77067 SCR MAMMO BI INCL CAD: CPT | Mod: 26,,, | Performed by: RADIOLOGY

## 2024-01-23 PROCEDURE — 77067 SCR MAMMO BI INCL CAD: CPT | Mod: TC

## 2024-09-10 ENCOUNTER — OFFICE VISIT (OUTPATIENT)
Dept: PSYCHOLOGY | Facility: CLINIC | Age: 49
End: 2024-09-10
Payer: MEDICARE

## 2024-09-10 VITALS
OXYGEN SATURATION: 100 % | TEMPERATURE: 98 F | SYSTOLIC BLOOD PRESSURE: 171 MMHG | HEART RATE: 112 BPM | DIASTOLIC BLOOD PRESSURE: 119 MMHG

## 2024-09-10 DIAGNOSIS — F41.1 GENERALIZED ANXIETY DISORDER: ICD-10-CM

## 2024-09-10 DIAGNOSIS — F43.21 GRIEF: Primary | ICD-10-CM

## 2024-09-10 DIAGNOSIS — F33.2 SEVERE EPISODE OF RECURRENT MAJOR DEPRESSIVE DISORDER, WITHOUT PSYCHOTIC FEATURES: ICD-10-CM

## 2024-09-10 PROCEDURE — 3077F SYST BP >= 140 MM HG: CPT | Mod: CPTII,,, | Performed by: NURSE PRACTITIONER

## 2024-09-10 PROCEDURE — 3080F DIAST BP >= 90 MM HG: CPT | Mod: CPTII,,, | Performed by: NURSE PRACTITIONER

## 2024-09-10 PROCEDURE — 1159F MED LIST DOCD IN RCRD: CPT | Mod: CPTII,,, | Performed by: NURSE PRACTITIONER

## 2024-09-10 PROCEDURE — 90839 PSYTX CRISIS INITIAL 60 MIN: CPT | Mod: S$PBB,,, | Performed by: NURSE PRACTITIONER

## 2024-09-10 PROCEDURE — 99205 OFFICE O/P NEW HI 60 MIN: CPT | Mod: S$PBB,,, | Performed by: NURSE PRACTITIONER

## 2024-09-10 PROCEDURE — 90839 PSYTX CRISIS INITIAL 60 MIN: CPT | Mod: PBBFAC,PN | Performed by: NURSE PRACTITIONER

## 2024-09-10 PROCEDURE — G2211 COMPLEX E/M VISIT ADD ON: HCPCS | Mod: S$PBB,,, | Performed by: NURSE PRACTITIONER

## 2024-09-10 PROCEDURE — 99213 OFFICE O/P EST LOW 20 MIN: CPT | Mod: PBBFAC,PN | Performed by: NURSE PRACTITIONER

## 2024-09-10 PROCEDURE — 99999 PR PBB SHADOW E&M-EST. PATIENT-LVL III: CPT | Mod: PBBFAC,SA,HB, | Performed by: NURSE PRACTITIONER

## 2024-09-10 PROCEDURE — 4010F ACE/ARB THERAPY RXD/TAKEN: CPT | Mod: CPTII,,, | Performed by: NURSE PRACTITIONER

## 2024-09-10 RX ORDER — FLUOXETINE HYDROCHLORIDE 40 MG/1
40 CAPSULE ORAL DAILY
Qty: 30 CAPSULE | Refills: 1 | Status: SHIPPED | OUTPATIENT
Start: 2024-09-10 | End: 2024-09-10

## 2024-09-10 RX ORDER — FLUOXETINE HYDROCHLORIDE 40 MG/1
80 CAPSULE ORAL DAILY
Qty: 60 CAPSULE | Refills: 1 | Status: SHIPPED | OUTPATIENT
Start: 2024-09-10 | End: 2024-11-09
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
## 2024-10-15 ENCOUNTER — OFFICE VISIT (OUTPATIENT)
Dept: PSYCHOLOGY | Facility: CLINIC | Age: 49
End: 2024-10-15
Payer: MEDICARE

## 2024-10-15 VITALS
DIASTOLIC BLOOD PRESSURE: 87 MMHG | SYSTOLIC BLOOD PRESSURE: 133 MMHG | RESPIRATION RATE: 19 BRPM | HEART RATE: 95 BPM | OXYGEN SATURATION: 97 %

## 2024-10-15 DIAGNOSIS — F43.21 GRIEF: Primary | ICD-10-CM

## 2024-10-15 DIAGNOSIS — F41.1 GENERALIZED ANXIETY DISORDER: ICD-10-CM

## 2024-10-15 DIAGNOSIS — G47.00 INSOMNIA, UNSPECIFIED TYPE: ICD-10-CM

## 2024-10-15 DIAGNOSIS — F33.2 SEVERE EPISODE OF RECURRENT MAJOR DEPRESSIVE DISORDER, WITHOUT PSYCHOTIC FEATURES: ICD-10-CM

## 2024-10-15 PROCEDURE — 99214 OFFICE O/P EST MOD 30 MIN: CPT | Mod: S$GLB,,, | Performed by: NURSE PRACTITIONER

## 2024-10-15 PROCEDURE — 3075F SYST BP GE 130 - 139MM HG: CPT | Mod: CPTII,S$GLB,, | Performed by: NURSE PRACTITIONER

## 2024-10-15 PROCEDURE — 3079F DIAST BP 80-89 MM HG: CPT | Mod: CPTII,S$GLB,, | Performed by: NURSE PRACTITIONER

## 2024-10-15 PROCEDURE — G2211 COMPLEX E/M VISIT ADD ON: HCPCS | Mod: S$GLB,,, | Performed by: NURSE PRACTITIONER

## 2024-10-15 PROCEDURE — 4010F ACE/ARB THERAPY RXD/TAKEN: CPT | Mod: CPTII,S$GLB,, | Performed by: NURSE PRACTITIONER

## 2024-10-15 PROCEDURE — 99999 PR PBB SHADOW E&M-EST. PATIENT-LVL II: CPT | Mod: PBBFAC,,, | Performed by: NURSE PRACTITIONER

## 2024-10-15 PROCEDURE — 90833 PSYTX W PT W E/M 30 MIN: CPT | Mod: S$GLB,,, | Performed by: NURSE PRACTITIONER

## 2024-10-15 RX ORDER — FLUOXETINE HYDROCHLORIDE 20 MG/1
20 CAPSULE ORAL DAILY
Qty: 30 CAPSULE | Refills: 1 | Status: SHIPPED | OUTPATIENT
Start: 2024-10-15 | End: 2024-12-14

## 2024-10-15 RX ORDER — FLUOXETINE HYDROCHLORIDE 40 MG/1
40 CAPSULE ORAL DAILY
Qty: 30 CAPSULE | Refills: 1 | Status: SHIPPED | OUTPATIENT
Start: 2024-10-15 | End: 2024-10-15

## 2024-10-15 RX ORDER — HYDROXYZINE PAMOATE 25 MG/1
25 CAPSULE ORAL DAILY PRN
Qty: 30 CAPSULE | Refills: 1 | Status: SHIPPED | OUTPATIENT
Start: 2024-10-15 | End: 2024-12-14

## 2024-10-15 RX ORDER — TRAZODONE HYDROCHLORIDE 50 MG/1
TABLET ORAL
Qty: 30 TABLET | Refills: 1 | Status: SHIPPED | OUTPATIENT
Start: 2024-10-15

## 2024-10-15 NOTE — PROGRESS NOTES
"Outpatient Psychiatry Follow-Up Visit ([facility])    10/15/2024    Clinical Status of Patient:  Outpatient (Ambulatory)    Chief Complaint:  Gay Avila is a 48 y.o. female who presents today for follow-up of depression and anxiety.  Met with patient.     Current Medications:   Increase Prozac to 80 mg Daily  Trazodone 50 mg Daily - by PCP  Gabapentin 300 mg TID-    Past Medication Trials:  Effexor- effective,  Wellbutrin- not effective, zombie  Hydroxyzine- grogginess, effective for sleep  Trazodone 50 mg- at times effective  Buspar- Palpitations    Interval History and Content of Current Session:  Patient seen and chart reviewed. Last seen on 9/10/24    Changes at last visit:  Increase Prozac to 80 mg Alfonso    Patient began with apologizing for the previous visit "I had just had a panic attack, I didn't mean to be mean, I'm so sorry". Reports her anxiety has not improved with the increase in Prozac to 80 mg, also feels she is experiencing brain fog. Reports a sensation of heat in her chest whenerver she is reminded of her son, She tries meditation and praying, some days she forgets to eat but this is been improving over the pasty 3 weeks.   Reports going out with friends to an arts and crafts festival recently, she ran into people who hugged her but some asked about her son which triggered many emotions. Reports she began seeing a therapist, has had 2 session so far.  Reports she only cries or feels sad or down when she has to talk about her situation.     Denies SI/HI/AVH. Denies adverse effects from medication  Pt reports taking medications as prescribed and behaving appropriately during interview today.    Pt appears:  Appropriate attire and affect    Mood:  Sad, Tearful    Sleep:  "Like a baby" ave 8 hrs, feels rested in AM.     Appetite:  improving    Self Rates Depression: 5/10  Self Rated Anxiety:  5 /10    Psychotherapy:  Target symptoms: depression, anxiety   Why chosen therapy is appropriate versus " another modality: relevant to diagnosis, evidence based practice  Outcome monitoring methods: self-report, observation  Therapeutic intervention type: insight oriented psychotherapy, supportive psychotherapy  Topics discussed/themes: relationships difficulties, illness/death of a loved one, stress related to medical comorbidities, symptom recognition  The patient's response to the intervention is motivated. The patient's progress toward treatment goals is fair.   Duration of intervention: 20 minutes.    Review of Systems   PSYCHIATRIC: Pertinant items are noted in the narrative.        Past Medical, Family and Social History: The patient's past medical, family and social history have been reviewed and updated as appropriate within the electronic medical record - see encounter notes.    Compliance: yes    Side effects:  brain fog    Risk Parameters:  Patient reports no suicidal ideation  Patient reports no homicidal ideation  Patient reports no self-injurious behavior  Patient reports no violent behavior    Exam (detailed: at least 9 elements; comprehensive: all 15 elements)   Constitutional  Vitals:  Most recent vital signs, dated less than 90 days prior to this appointment, were reviewed.   Vitals:    10/15/24 1353   BP: 133/87   Pulse: 95   Resp: 19   SpO2: 97%        General:  unremarkable, age appropriate     Musculoskeletal  Muscle Strength/Tone:  no spasicity, no rigidity, no cogwheeling, no flaccidity, no paratonia, no dyskinesia, no dystonia, no tremor, no tic, no choreoathetosis, no atrophy   Gait & Station:  non-ataxic     Psychiatric  Speech:  no latency; no press   Mood & Affect:  depressed, sad  congruent and appropriate, sad   Thought Process:  normal and logical   Associations:  intact   Thought Content:  normal, no suicidality, no homicidality, delusions, or paranoia   Insight:  intact, has awareness of illness   Judgement: behavior is adequate to circumstances, age appropriate   Orientation:   grossly intact, person, place, situation, time/date, day of week, month of year   Memory: intact for content of interview, memory >3 objects at five mins   Language: grossly intact, able to name, able to repeat   Attention Span & Concentration:  able to focus, completed tasks   Fund of Knowledge:  intact and appropriate to age and level of education, familiar with aspects of current personal life     Assessment and Diagnosis   Status/Progress: Based on the examination today, the patient's problem(s) is/are inadequately controlled.  New problems have not been presented today.   Co-morbidities, Diagnostic uncertainty, and Lack of compliance are not complicating management of the primary condition.  There are no active rule-out diagnoses for this patient at this time.     General Impression: Gay Avila, a 48 y.o. female, presenting for follow up for visit generalized anxiety disorder r/o drug seeking behavior.   Presents 09/11/2024 -Increase Prozac to 40 mg Daily  Presents 10/15/2024 -Decrease prozac to 60 mg Daily, consider switching back to Effexor at next visit., patient request.      ICD-10-CM ICD-9-CM    1. Grief  F43.21 309.0       2. Generalized anxiety disorder  F41.1 300.02 FLUoxetine 20 MG capsule      hydrOXYzine pamoate (VISTARIL) 25 MG Cap      DISCONTINUED: FLUoxetine 40 MG capsule      3. Severe episode of recurrent major depressive disorder, without psychotic features  F33.2 296.33 FLUoxetine 20 MG capsule      DISCONTINUED: FLUoxetine 40 MG capsule      4. Insomnia, unspecified type  G47.00 780.52 hydrOXYzine pamoate (VISTARIL) 25 MG Cap      traZODone (DESYREL) 50 MG tablet          Intervention/Counseling/Treatment Plan   Medication Management: The risks and benefits of medication were discussed with the patient.  Decrease Prozac to 60 mg Daily  Discussed diagnosis, risk and benefits of proposed treatment above vs alternative treatment vs no treatment, and potential side effects of these  treatments, and the inherent unpredictability of individual responses to these treatments. The patient expresses understanding and gives informed consent to pursue treatment at this time, believing that the potential benefits outweigh the potential risks. Patient has no other questions. Risks/adverse effects at this time include but are not limited to: GI side effects, sexual dysfunction, activation vs sedation, triggering of suicidal ideation, and serotonin syndrome.   Patient voices understanding and agreement with this plan  Provided crisis numbers  Encouraged patient to keep future appointments  Instruct patient to call or message with questions  In the event of an emergency, including suicidal ideation, patient was advised to go to the emergency room      Return to Clinic: 1 month        Vanessa Mas DNP, PMPRABHAKARP, FNP

## 2024-10-23 ENCOUNTER — TELEPHONE (OUTPATIENT)
Dept: PSYCHIATRY | Facility: CLINIC | Age: 49
End: 2024-10-23
Payer: MEDICAID

## 2024-10-23 NOTE — TELEPHONE ENCOUNTER
Called about [facility] intake openings for 10/30. Patient noted she has found a therapist at [facility] (Dr. Davidson). She is ok with closing this referral.

## 2024-11-11 ENCOUNTER — OFFICE VISIT (OUTPATIENT)
Dept: PSYCHOLOGY | Facility: CLINIC | Age: 49
End: 2024-11-11
Payer: MEDICARE

## 2024-11-11 DIAGNOSIS — F41.1 GENERALIZED ANXIETY DISORDER: ICD-10-CM

## 2024-11-11 DIAGNOSIS — G47.00 INSOMNIA, UNSPECIFIED TYPE: ICD-10-CM

## 2024-11-11 DIAGNOSIS — F43.21 GRIEF: Primary | ICD-10-CM

## 2024-11-11 DIAGNOSIS — F33.2 SEVERE EPISODE OF RECURRENT MAJOR DEPRESSIVE DISORDER, WITHOUT PSYCHOTIC FEATURES: ICD-10-CM

## 2024-11-11 PROCEDURE — 99214 OFFICE O/P EST MOD 30 MIN: CPT | Mod: 95,,, | Performed by: NURSE PRACTITIONER

## 2024-11-11 RX ORDER — TRAZODONE HYDROCHLORIDE 50 MG/1
TABLET ORAL
Qty: 30 TABLET | Refills: 1 | Status: SHIPPED | OUTPATIENT
Start: 2024-11-11

## 2024-11-11 RX ORDER — CARIPRAZINE 1.5 MG/1
1.5 CAPSULE, GELATIN COATED ORAL DAILY
Qty: 30 CAPSULE | Refills: 1 | Status: SHIPPED | OUTPATIENT
Start: 2024-11-11 | End: 2024-11-11 | Stop reason: ALTCHOICE

## 2024-11-11 RX ORDER — VENLAFAXINE HYDROCHLORIDE 75 MG/1
75 CAPSULE, EXTENDED RELEASE ORAL DAILY
Qty: 30 CAPSULE | Refills: 11 | Status: SHIPPED | OUTPATIENT
Start: 2024-11-11 | End: 2025-11-11

## 2024-11-11 NOTE — PROGRESS NOTES
The patient location is: [place], [place]  The chief complaint leading to consultation is: Greif, Depression, Anxiety    Visit type: audiovisual    Each patient to whom he or she provides medical services by telemedicine is:  (1) informed of the relationship between the physician and patient and the respective role of any other health care provider with respect to management of the patient; and (2) notified that he or she may decline to receive medical services by telemedicine and may withdraw from such care at any time.    Notes:     Outpatient Psychiatry Follow-Up Visit ([facility])    11/18/2024    Clinical Status of Patient:  Outpatient (Ambulatory)    Chief Complaint:  Gay Avila is a 48 y.o. female who presents today for follow-up of depression and anxiety.  Met with patient.     Current Medications:   Prozac 60 mg Daily  Trazodone 50 mg Daily - by PCP  Gabapentin 300 mg TID-    Past Medication Trials:  Effexor- effective,  Wellbutrin- not effective, zombie  Hydroxyzine- grogginess, effective for sleep  Trazodone 50 mg- at times effective  Buspar- Palpitations    Interval History and Content of Current Session:  Patient seen and chart reviewed. Last seen on 10/10/24    Changes at last visit:  Decrease Prozac to 60 mg Daily    Patient reports she's been feeling very sad lately, continues to think about her son who has passed. She reports having crying spells feeling nauseas and having headaches. States she's starting to feel like anti depressants are probably not a miracle medication.  She reports starting therapy, has been to 3 sessions so far. Reports it helps but she cannot talk to thi person every day. Reports there are days where Trazodone does not help and she is unable to sleep.   Patient is requestin to [facility] to effexor.     Denies SI/HI/AVH. Denies adverse effects from medication  Pt reports taking medications as prescribed and behaving appropriately during interview today.    Pt appears:  Appropriate  "attire and affect    Mood:  Sad, Tearful    Sleep:  "Like a baby" ave 8 hrs, feels rested in AM.     Appetite:  improving    Self Rates Depression: 5/10  Self Rated Anxiety:  5 /10    Psychotherapy:  Target symptoms: depression, anxiety   Why chosen therapy is appropriate versus another modality: relevant to diagnosis, evidence based practice  Outcome monitoring methods: self-report, observation  Therapeutic intervention type: insight oriented psychotherapy, supportive psychotherapy  Topics discussed/themes: relationships difficulties, illness/death of a loved one, stress related to medical comorbidities, symptom recognition  The patient's response to the intervention is motivated. The patient's progress toward treatment goals is fair.   Duration of intervention: 20 minutes.    Review of Systems   PSYCHIATRIC: Pertinant items are noted in the narrative.        Past Medical, Family and Social History: The patient's past medical, family and social history have been reviewed and updated as appropriate within the electronic medical record - see encounter notes.    Compliance: yes    Side effects:  brain fog    Risk Parameters:  Patient reports no suicidal ideation  Patient reports no homicidal ideation  Patient reports no self-injurious behavior  Patient reports no violent behavior    Exam (detailed: at least 9 elements; comprehensive: all 15 elements)   Constitutional  Vitals:  Most recent vital signs, dated less than 90 days prior to this appointment, were reviewed.   There were no vitals filed for this visit.       General:  unremarkable, age appropriate     Musculoskeletal  Muscle Strength/Tone:  no spasicity, no rigidity, no cogwheeling, no flaccidity, no paratonia, no dyskinesia, no dystonia, no tremor, no tic, no choreoathetosis, no atrophy   Gait & Station:  non-ataxic     Psychiatric  Speech:  no latency; no press   Mood & Affect:  depressed, sad  congruent and appropriate, sad   Thought Process:  normal and " logical   Associations:  intact   Thought Content:  normal, no suicidality, no homicidality, delusions, or paranoia   Insight:  intact, has awareness of illness   Judgement: behavior is adequate to circumstances, age appropriate   Orientation:  grossly intact, person, place, situation, time/date, day of week, month of year   Memory: intact for content of interview, memory >3 objects at five mins   Language: grossly intact, able to name, able to repeat   Attention Span & Concentration:  able to focus, completed tasks   Fund of Knowledge:  intact and appropriate to age and level of education, familiar with aspects of current personal life     Assessment and Diagnosis   Status/Progress: Based on the examination today, the patient's problem(s) is/are inadequately controlled.  New problems have not been presented today.   Co-morbidities, Diagnostic uncertainty, and Lack of compliance are not complicating management of the primary condition.  There are no active rule-out diagnoses for this patient at this time.     General Impression: Gay Avila, a 48 y.o. female, presenting for follow up for visit generalized anxiety disorder r/o drug seeking behavior.   Presents 09/11/2024 -Increase Prozac to 40 mg Daily  Presents 10/15/2024 -Decrease prozac to 60 mg Daily, consider switching back to Effexor at next visit., patient request.  Presents 11/11/24- D/C Prozac, Start Effexor 75 mg XR Daily. Urine Drug screen,- patient failed UDS, + for Opiods which are not prescribed to her.       ICD-10-CM ICD-9-CM    1. Grief  F43.21 309.0 venlafaxine (EFFEXOR-XR) 75 MG 24 hr capsule      2. Generalized anxiety disorder  F41.1 300.02 venlafaxine (EFFEXOR-XR) 75 MG 24 hr capsule      Toxicology Screen, Urine      3. Severe episode of recurrent major depressive disorder, without psychotic features  F33.2 296.33 venlafaxine (EFFEXOR-XR) 75 MG 24 hr capsule      DISCONTINUED: cariprazine (VRAYLAR) 1.5 mg Cap      4. Insomnia, unspecified  type  G47.00 780.52 traZODone (DESYREL) 50 MG tablet          Intervention/Counseling/Treatment Plan   Medication Management: The risks and benefits of medication were discussed with the patient.  D/C Prozac  Start Effexor 75 mg XR Daily.   Urine Drug screen, then start Klonopin.   patient failed UDS, + for Opiods which are not prescribed to her.    Discussed diagnosis, risk and benefits of proposed treatment above vs alternative treatment vs no treatment, and potential side effects of these treatments, and the inherent unpredictability of individual responses to these treatments. The patient expresses understanding and gives informed consent to pursue treatment at this time, believing that the potential benefits outweigh the potential risks. Patient has no other questions. Risks/adverse effects at this time include but are not limited to: GI side effects, sexual dysfunction, activation vs sedation, triggering of suicidal ideation, and serotonin syndrome.   Patient voices understanding and agreement with this plan  Provided crisis numbers  Encouraged patient to keep future appointments  Instruct patient to call or message with questions  In the event of an emergency, including suicidal ideation, patient was advised to go to the emergency room      Return to Clinic: 1 month        Vanessa Mas DNP, PMHNP, FNP

## 2024-11-13 ENCOUNTER — PATIENT MESSAGE (OUTPATIENT)
Dept: PSYCHOLOGY | Facility: CLINIC | Age: 49
End: 2024-11-13
Payer: MEDICAID

## 2024-12-20 ENCOUNTER — OFFICE VISIT (OUTPATIENT)
Dept: PSYCHIATRY | Facility: CLINIC | Age: 49
End: 2024-12-20
Payer: MEDICAID

## 2024-12-20 DIAGNOSIS — G47.00 INSOMNIA, UNSPECIFIED TYPE: ICD-10-CM

## 2024-12-20 DIAGNOSIS — F43.21 GRIEF: ICD-10-CM

## 2024-12-20 DIAGNOSIS — F33.1 MODERATE EPISODE OF RECURRENT MAJOR DEPRESSIVE DISORDER: ICD-10-CM

## 2024-12-20 DIAGNOSIS — F33.2 SEVERE EPISODE OF RECURRENT MAJOR DEPRESSIVE DISORDER, WITHOUT PSYCHOTIC FEATURES: ICD-10-CM

## 2024-12-20 DIAGNOSIS — F43.21 GRIEF AT LOSS OF CHILD: Primary | ICD-10-CM

## 2024-12-20 DIAGNOSIS — F41.1 GENERALIZED ANXIETY DISORDER: ICD-10-CM

## 2024-12-20 DIAGNOSIS — Z63.4 GRIEF AT LOSS OF CHILD: Primary | ICD-10-CM

## 2024-12-20 RX ORDER — TRAZODONE HYDROCHLORIDE 50 MG/1
TABLET ORAL
Qty: 30 TABLET | Refills: 1 | Status: SHIPPED | OUTPATIENT
Start: 2024-12-20

## 2024-12-20 RX ORDER — VENLAFAXINE HYDROCHLORIDE 75 MG/1
75 CAPSULE, EXTENDED RELEASE ORAL DAILY
Qty: 30 CAPSULE | Refills: 1 | Status: SHIPPED | OUTPATIENT
Start: 2024-12-20 | End: 2025-02-18

## 2024-12-20 SDOH — SOCIAL DETERMINANTS OF HEALTH (SDOH): DISSAPEARANCE AND DEATH OF FAMILY MEMBER: Z63.4

## 2024-12-20 NOTE — PROGRESS NOTES
"The patient location is: [place], [place]  The chief complaint leading to consultation is: Greif, Depression, Anxiety    Visit type: audiovisual    Each patient to whom he or she provides medical services by telemedicine is:  (1) informed of the relationship between the physician and patient and the respective role of any other health care provider with respect to management of the patient; and (2) notified that he or she may decline to receive medical services by telemedicine and may withdraw from such care at any time.    Notes:     Outpatient Psychiatry Follow-Up Visit ([facility])    12/20/2024    Clinical Status of Patient:  Outpatient (Ambulatory)    Chief Complaint:  Gay Avila is a 48 y.o. female who presents today for follow-up of depression and anxiety.  Met with patient.     Current Medications:   Effexor 75 mg Daily  Trazodone 50 mg Daily - by PCP  Gabapentin 300 mg TID-    Past Medication Trials:  Effexor- effective,  Wellbutrin- not effective, zombie  Hydroxyzine- grogginess, effective for sleep  Trazodone 50 mg- at times effective  Buspar- Palpitations    Interval History and Content of Current Session:  Patient seen and chart reviewed. Last seen on 11/11/24    Changes at last visit:  D/C Prozac  Start Effexor 75 mg XR Daily.   Urine Drug screen, then start Klonopin.   patient failed UDS, + for Opiods which are not prescribed to her.     Reports feeling her switch in antidepressants has been going well. Reports initially Effexor made her feel drowsy so she began taking it at night. Reports she's been able to attend her family kan party and dressed as alexis for the kids. Reports trying to pushing herself to feel better and do things rather than "sitting around and moping". Reports when she speaks about her son at times it's in the past tense and other times in present. Continues to attend grief counseling, next session is after the holidays. Reports sleep has been up and down, reports now having " strange dreams.     Denies SI/HI/AVH. Denies adverse effects from medication  Pt reports taking medications as prescribed and behaving appropriately during interview today.    Pt appears:  Appropriate attire and affect    Mood:  Sad, Tearful    Sleep:  ave 5-8 hrs, with trazodone     Appetite:  improving    Self Rates Depression: 5/10  Self Rated Anxiety:  5 /10    Psychotherapy:  Target symptoms: depression, anxiety   Why chosen therapy is appropriate versus another modality: relevant to diagnosis, evidence based practice  Outcome monitoring methods: self-report, observation  Therapeutic intervention type: insight oriented psychotherapy, supportive psychotherapy  Topics discussed/themes: relationships difficulties, illness/death of a loved one, stress related to medical comorbidities, symptom recognition  The patient's response to the intervention is motivated. The patient's progress toward treatment goals is fair.   Duration of intervention: 20 minutes.    Review of Systems   PSYCHIATRIC: Pertinant items are noted in the narrative.        Past Medical, Family and Social History: The patient's past medical, family and social history have been reviewed and updated as appropriate within the electronic medical record - see encounter notes.    Compliance: yes    Side effects:  brain fog    Risk Parameters:  Patient reports no suicidal ideation  Patient reports no homicidal ideation  Patient reports no self-injurious behavior  Patient reports no violent behavior    Exam (detailed: at least 9 elements; comprehensive: all 15 elements)   Constitutional  Vitals:  Most recent vital signs, dated less than 90 days prior to this appointment, were reviewed.   There were no vitals filed for this visit.       General:  unremarkable, age appropriate     Musculoskeletal  Muscle Strength/Tone:  no spasicity, no rigidity, no cogwheeling, no flaccidity, no paratonia, no dyskinesia, no dystonia, no tremor, no tic, no choreoathetosis, no  atrophy   Gait & Station:  non-ataxic     Psychiatric  Speech:  no latency; no press   Mood & Affect:  depressed, sad  congruent and appropriate, sad   Thought Process:  normal and logical   Associations:  intact   Thought Content:  normal, no suicidality, no homicidality, delusions, or paranoia   Insight:  intact, has awareness of illness   Judgement: behavior is adequate to circumstances, age appropriate   Orientation:  grossly intact, person, place, situation, time/date, day of week, month of year   Memory: intact for content of interview, memory >3 objects at five mins   Language: grossly intact, able to name, able to repeat   Attention Span & Concentration:  able to focus, completed tasks   Fund of Knowledge:  intact and appropriate to age and level of education, familiar with aspects of current personal life     Assessment and Diagnosis   Status/Progress: Based on the examination today, the patient's problem(s) is/are inadequately controlled.  New problems have not been presented today.   Co-morbidities, Diagnostic uncertainty, and Lack of compliance are not complicating management of the primary condition.  There are no active rule-out diagnoses for this patient at this time.     General Impression: Gay Avila, a 48 y.o. female, presenting for follow up for visit generalized anxiety disorder r/o drug seeking behavior.   Presents 09/11/2024 -Increase Prozac to 40 mg Daily  Presents 10/15/2024 -Decrease prozac to 60 mg Daily, consider switching back to Effexor at next visit., patient request.  Presents 11/11/24- D/C Prozac, Start Effexor 75 mg XR Daily. Urine Drug screen,- patient failed UDS, + for Opiods which are not prescribed to her.   Presents 12/20/24-Continue medications, after holidays pass consider increasing Effexor.         ICD-10-CM ICD-9-CM    1. Grief at loss of child  F43.21 309.0     Z63.4        2. Moderate episode of recurrent major depressive disorder  F33.1 296.32       3. Generalized  anxiety disorder  F41.1 300.02 venlafaxine (EFFEXOR-XR) 75 MG 24 hr capsule      4. Insomnia, unspecified type  G47.00 780.52 traZODone (DESYREL) 50 MG tablet      5. Grief  F43.21 309.0 venlafaxine (EFFEXOR-XR) 75 MG 24 hr capsule      6. Severe episode of recurrent major depressive disorder, without psychotic features  F33.2 296.33 venlafaxine (EFFEXOR-XR) 75 MG 24 hr capsule          Intervention/Counseling/Treatment Plan   Medication Management: The risks and benefits of medication were discussed with the patient.  Continue Effexor 75 mg XR Daily.   Continue Trazodone 50 mg Nightly PRN  Discussed diagnosis, risk and benefits of proposed treatment above vs alternative treatment vs no treatment, and potential side effects of these treatments, and the inherent unpredictability of individual responses to these treatments. The patient expresses understanding and gives informed consent to pursue treatment at this time, believing that the potential benefits outweigh the potential risks. Patient has no other questions. Risks/adverse effects at this time include but are not limited to: GI side effects, sexual dysfunction, activation vs sedation, triggering of suicidal ideation, and serotonin syndrome.   Patient voices understanding and agreement with this plan  Provided crisis numbers  Encouraged patient to keep future appointments  Instruct patient to call or message with questions  In the event of an emergency, including suicidal ideation, patient was advised to go to the emergency room      Return to Clinic: 1 month        Vanessa Mas DNP, PMPRABHAKARP, FNP

## 2025-01-27 ENCOUNTER — HOSPITAL ENCOUNTER (OUTPATIENT)
Dept: RADIOLOGY | Facility: HOSPITAL | Age: 50
Discharge: HOME | End: 2025-01-27
Attending: INTERNAL MEDICINE
Payer: MEDICARE

## 2025-01-27 DIAGNOSIS — Z12.39 SCREENING BREAST EXAMINATION: ICD-10-CM

## 2025-01-27 PROCEDURE — 77063 BREAST TOMOSYNTHESIS BI: CPT | Mod: 26,,, | Performed by: RADIOLOGY

## 2025-01-27 PROCEDURE — 77067 SCR MAMMO BI INCL CAD: CPT | Mod: TC

## 2025-01-27 PROCEDURE — 77067 SCR MAMMO BI INCL CAD: CPT | Mod: 26,,, | Performed by: RADIOLOGY
